# Patient Record
Sex: FEMALE | Race: WHITE | NOT HISPANIC OR LATINO | URBAN - METROPOLITAN AREA
[De-identification: names, ages, dates, MRNs, and addresses within clinical notes are randomized per-mention and may not be internally consistent; named-entity substitution may affect disease eponyms.]

---

## 2023-07-15 DIAGNOSIS — F32.A DEPRESSION, UNSPECIFIED DEPRESSION TYPE: Primary | ICD-10-CM

## 2023-07-15 RX ORDER — SERTRALINE HYDROCHLORIDE 50 MG/1
50 TABLET, FILM COATED ORAL DAILY
Qty: 10 TABLET | Refills: 0 | Status: SHIPPED | OUTPATIENT
Start: 2023-07-15 | End: 2023-08-16 | Stop reason: ALTCHOICE

## 2023-08-11 ENCOUNTER — LAB (OUTPATIENT)
Dept: LAB | Facility: LAB | Age: 63
End: 2023-08-11
Payer: COMMERCIAL

## 2023-08-11 DIAGNOSIS — Z00.00 HEALTH MAINTENANCE EXAMINATION: ICD-10-CM

## 2023-08-11 LAB
ALANINE AMINOTRANSFERASE (SGPT) (U/L) IN SER/PLAS: 16 U/L (ref 7–45)
ALBUMIN (G/DL) IN SER/PLAS: 4.7 G/DL (ref 3.4–5)
ALKALINE PHOSPHATASE (U/L) IN SER/PLAS: 63 U/L (ref 33–136)
ANION GAP IN SER/PLAS: 13 MMOL/L (ref 10–20)
APPEARANCE, URINE: CLEAR
ASPARTATE AMINOTRANSFERASE (SGOT) (U/L) IN SER/PLAS: 24 U/L (ref 9–39)
BASOPHILS (10*3/UL) IN BLOOD BY AUTOMATED COUNT: 0.04 X10E9/L (ref 0–0.1)
BASOPHILS/100 LEUKOCYTES IN BLOOD BY AUTOMATED COUNT: 1.5 % (ref 0–2)
BILIRUBIN TOTAL (MG/DL) IN SER/PLAS: 0.4 MG/DL (ref 0–1.2)
BILIRUBIN, URINE: NEGATIVE
BLOOD, URINE: NEGATIVE
C REACTIVE PROTEIN (MG/L) IN SER/PLAS BY HIGH SENSIT: 0.3 MG/L
CALCIDIOL (25 OH VITAMIN D3) (NG/ML) IN SER/PLAS: 53 NG/ML
CALCIUM (MG/DL) IN SER/PLAS: 9.7 MG/DL (ref 8.6–10.6)
CARBON DIOXIDE, TOTAL (MMOL/L) IN SER/PLAS: 27 MMOL/L (ref 21–32)
CHLORIDE (MMOL/L) IN SER/PLAS: 104 MMOL/L (ref 98–107)
CHOLESTEROL (MG/DL) IN SER/PLAS: 238 MG/DL (ref 0–199)
CHOLESTEROL IN HDL (MG/DL) IN SER/PLAS: 71.3 MG/DL
CHOLESTEROL/HDL RATIO: 3.3
COLOR, URINE: YELLOW
CREATININE (MG/DL) IN SER/PLAS: 0.54 MG/DL (ref 0.5–1.05)
EOSINOPHILS (10*3/UL) IN BLOOD BY AUTOMATED COUNT: 0.09 X10E9/L (ref 0–0.7)
EOSINOPHILS/100 LEUKOCYTES IN BLOOD BY AUTOMATED COUNT: 3.3 % (ref 0–6)
ERYTHROCYTE DISTRIBUTION WIDTH (RATIO) BY AUTOMATED COUNT: 12.2 % (ref 11.5–14.5)
ERYTHROCYTE MEAN CORPUSCULAR HEMOGLOBIN CONCENTRATION (G/DL) BY AUTOMATED: 33.3 G/DL (ref 32–36)
ERYTHROCYTE MEAN CORPUSCULAR VOLUME (FL) BY AUTOMATED COUNT: 94 FL (ref 80–100)
ERYTHROCYTES (10*6/UL) IN BLOOD BY AUTOMATED COUNT: 4.68 X10E12/L (ref 4–5.2)
ESTIMATED AVERAGE GLUCOSE FOR HBA1C: 108 MG/DL
GFR FEMALE: >90 ML/MIN/1.73M2
GLUCOSE (MG/DL) IN SER/PLAS: 89 MG/DL (ref 74–99)
GLUCOSE, URINE: NEGATIVE MG/DL
HEMATOCRIT (%) IN BLOOD BY AUTOMATED COUNT: 43.9 % (ref 36–46)
HEMOGLOBIN (G/DL) IN BLOOD: 14.6 G/DL (ref 12–16)
HEMOGLOBIN A1C/HEMOGLOBIN TOTAL IN BLOOD: 5.4 %
IMMATURE GRANULOCYTES/100 LEUKOCYTES IN BLOOD BY AUTOMATED COUNT: 0 % (ref 0–0.9)
KETONES, URINE: NEGATIVE MG/DL
LDL: 154 MG/DL (ref 0–99)
LEUKOCYTE ESTERASE, URINE: NEGATIVE
LEUKOCYTES (10*3/UL) IN BLOOD BY AUTOMATED COUNT: 2.8 X10E9/L (ref 4.4–11.3)
LYMPHOCYTES (10*3/UL) IN BLOOD BY AUTOMATED COUNT: 1.07 X10E9/L (ref 1.2–4.8)
LYMPHOCYTES/100 LEUKOCYTES IN BLOOD BY AUTOMATED COUNT: 38.9 % (ref 13–44)
MONOCYTES (10*3/UL) IN BLOOD BY AUTOMATED COUNT: 0.29 X10E9/L (ref 0.1–1)
MONOCYTES/100 LEUKOCYTES IN BLOOD BY AUTOMATED COUNT: 10.5 % (ref 2–10)
NEUTROPHILS (10*3/UL) IN BLOOD BY AUTOMATED COUNT: 1.26 X10E9/L (ref 1.2–7.7)
NEUTROPHILS/100 LEUKOCYTES IN BLOOD BY AUTOMATED COUNT: 45.8 % (ref 40–80)
NITRITE, URINE: NEGATIVE
NRBC (PER 100 WBCS) BY AUTOMATED COUNT: 0 /100 WBC (ref 0–0)
PH, URINE: 8 (ref 5–8)
PLATELETS (10*3/UL) IN BLOOD AUTOMATED COUNT: 211 X10E9/L (ref 150–450)
POTASSIUM (MMOL/L) IN SER/PLAS: 5.6 MMOL/L (ref 3.5–5.3)
PROTEIN TOTAL: 7 G/DL (ref 6.4–8.2)
PROTEIN, URINE: NEGATIVE MG/DL
SODIUM (MMOL/L) IN SER/PLAS: 138 MMOL/L (ref 136–145)
SPECIFIC GRAVITY, URINE: 1.01 (ref 1–1.03)
THYROTROPIN (MIU/L) IN SER/PLAS BY DETECTION LIMIT <= 0.05 MIU/L: 1.36 MIU/L (ref 0.44–3.98)
TRIGLYCERIDE (MG/DL) IN SER/PLAS: 63 MG/DL (ref 0–149)
UREA NITROGEN (MG/DL) IN SER/PLAS: 10 MG/DL (ref 6–23)
UROBILINOGEN, URINE: <2 MG/DL (ref 0–1.9)
VLDL: 13 MG/DL (ref 0–40)

## 2023-08-11 PROCEDURE — 36415 COLL VENOUS BLD VENIPUNCTURE: CPT

## 2023-08-11 PROCEDURE — 81003 URINALYSIS AUTO W/O SCOPE: CPT

## 2023-08-11 PROCEDURE — 80061 LIPID PANEL: CPT

## 2023-08-11 PROCEDURE — 85025 COMPLETE CBC W/AUTO DIFF WBC: CPT

## 2023-08-11 PROCEDURE — 80053 COMPREHEN METABOLIC PANEL: CPT

## 2023-08-11 PROCEDURE — 86141 C-REACTIVE PROTEIN HS: CPT

## 2023-08-11 PROCEDURE — 84443 ASSAY THYROID STIM HORMONE: CPT

## 2023-08-11 PROCEDURE — 83036 HEMOGLOBIN GLYCOSYLATED A1C: CPT

## 2023-08-11 PROCEDURE — 82306 VITAMIN D 25 HYDROXY: CPT

## 2023-08-16 PROBLEM — M19.90 ARTHRITIS: Status: ACTIVE | Noted: 2023-08-16

## 2023-08-16 PROBLEM — E78.00 HYPERCHOLESTEROLEMIA: Status: ACTIVE | Noted: 2023-08-16

## 2023-08-16 PROBLEM — C44.90 SKIN CANCER: Status: ACTIVE | Noted: 2018-09-30

## 2023-08-16 PROBLEM — I34.0 MITRAL REGURGITATION: Status: ACTIVE | Noted: 2018-09-30

## 2023-08-16 PROBLEM — I34.1 MITRAL VALVE PROLAPSE: Status: ACTIVE | Noted: 2018-09-30

## 2023-08-16 RX ORDER — FLUOROURACIL 50 MG/G
CREAM TOPICAL
COMMUNITY
Start: 2023-03-22

## 2023-08-16 RX ORDER — MULTIVIT,CALC,MINS/IRON/FOLIC 500-18-0.4
TABLET ORAL
COMMUNITY
Start: 2010-02-16

## 2023-08-16 RX ORDER — SERTRALINE HYDROCHLORIDE 50 MG/1
50 TABLET, FILM COATED ORAL
COMMUNITY
Start: 2021-07-14 | End: 2023-08-30 | Stop reason: SDUPTHER

## 2023-08-16 NOTE — PROGRESS NOTES
Physical Exam    Name Lizzeth Funk    Date of Service :8/17/2023      Lizzeth Funk is a 62 y.o. year old female who is being seen for a comprehensive exam    She is generally very healthy.   1. 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 leukopenia - has been documented in the past. No other hematologic abnormalities. Her sister has the same. She denies any frequent infections or any associated symptoms or signs  3. familial hypercholesteremia - Has never been on medication CCS 0 in June of 2020 .  She is not opposed. She does eat a healthy diet and exercises. We have decided to repeat her cardiac calcium score in 2025.    4. Multiple basal cell carcinomas - follows closely with Dermatology. Just had visit. see's her q 6 months   5. Melanoma - on calf of left calf. Dr. Ribera at [facility] Dr. Velazco  5. Mild MVP: Stress Echo 2015 no change from 2011  6. fibroids gets pelvic ultrasound annually (Dr. Dr. Lashawn Prakash ) Last Pap: 2020 normal , HPV: 2020 negative.   Last GYN exam 3/16/23   Pelvic ultrasound 5/22/23 for fibroid and cyst which are stable since 2009. No follow up needed    7. osteopenia - 10/5/22  lowest T score -1.5 in L spine  8. Dysthymia- improved on sertraline 50 mg  but hasn't cired. She is wondering if she should go off of it. She has been on it for 2 years now       Medications    calcium carbonate 600 mg (1,500 mg) Tab    multivits w-ca,fe,other min(ONE-A-DAY WOMENS FORMULA 27 MG-0.4 MG TAB)    omega-3 fatty acids/vitamin e(FISH OIL 1,000 MG CAP)       Patient Active Problem List   Diagnosis    Mitral regurgitation    Hypercholesterolemia    Mitral valve prolapse    Skin cancer    Arthritis        Past Medical History:   Diagnosis Date    Dysthymic disorder         Past Surgical History:   Procedure Laterality Date    DILATION AND CURETTAGE OF UTERUS      for DUB 2018    SKIN CANCER EXCISION      MOHS    WISDOM TOOTH EXTRACTION          Social History     Tobacco Use    Smoking status:  "Never    Smokeless tobacco: Never      Social History     Social History Narrative     with three adult children. 2 grandchildren, ages 4 and 2 1/2 originally from [place].     very [employer]. Goes to [employer], [place] upbringing     diet balanced. Exercises regularly.     Social alcohol use. Nonsmoker    runner, pllates         Family History   Problem Relation Name Age of Onset    Dementia Mother           2023  age of  88    Breast cancer Mother  52    Hyperlipidemia Mother      BRCA1 Negative Mother      Osteoporosis Mother      Coronary artery disease Mother      Hypertension Mother      Frontotemporal dementia Father          d. 81    Stroke Father      No Known Problems Sister          lost a son to accidental Fentanyl overdose    No Known Problems Sister      No Known Problems Brother      Diabetes type II Paternal Grandmother            Current Outpatient Medications:     calcium carbonate (CALCIUM 600 ORAL), Take by mouth., Disp: , Rfl:     fluorouracil (Efudex) 5 % cream, APPLY 1-2 TIMES A DAY DAILY TO ACTINIC KERATOSES FOR 2-4 WEEKS AS TOLERATED. WASH OFF IN AM., Disp: , Rfl:     L. acidophilus/Bifid. animalis 32 billion cell capsule, Take by mouth., Disp: , Rfl:     multivit-iron-FA-calcium-mins (One-A-Day Womens Formula) 18 mg iron-400 mcg-500 mg Ca tablet, Take by mouth., Disp: , Rfl:     sertraline (Zoloft) 50 mg tablet, Take 1 tablet (50 mg) by mouth once daily., Disp: , Rfl:     No Known Allergies    Review of Systems     /68 (BP Location: Left arm, Patient Position: Sitting)   Pulse 81   Temp 36.6 °C (97.9 °F)   Ht 1.575 m (5' 2\")   Wt 46.9 kg (103 lb 8 oz)   SpO2 98%   BMI 18.93 kg/m²  Body mass index is 18.93 kg/m².    Physical Exam  Vitals reviewed.   Constitutional:       General: She is not in acute distress.     Appearance: Normal appearance.   HENT:      Right Ear: Tympanic membrane and external ear normal.      Left Ear: Tympanic membrane and " external ear normal.      Mouth/Throat:      Pharynx: No oropharyngeal exudate or posterior oropharyngeal erythema.   Eyes:      Extraocular Movements: Extraocular movements intact.   Neck:      Vascular: No carotid bruit.   Cardiovascular:      Rate and Rhythm: Normal rate and regular rhythm.      Pulses: Normal pulses.      Heart sounds: Normal heart sounds. No murmur heard.     No gallop.   Pulmonary:      Effort: Pulmonary effort is normal. No respiratory distress.      Breath sounds: Normal breath sounds. No wheezing, rhonchi or rales.   Abdominal:      General: Bowel sounds are normal. There is no distension.      Palpations: There is no mass.      Tenderness: There is no abdominal tenderness. There is no guarding.   Musculoskeletal:         General: No swelling or tenderness. Normal range of motion.      Right lower leg: No edema.      Left lower leg: No edema.   Lymphadenopathy:      Cervical: No cervical adenopathy.      Upper Body:      Right upper body: No supraclavicular or axillary adenopathy.      Left upper body: No supraclavicular or axillary adenopathy.      Lower Body: No right inguinal adenopathy. No left inguinal adenopathy.   Skin:     General: Skin is warm and dry.      Findings: No rash.   Neurological:      General: No focal deficit present.      Mental Status: She is alert.   Psychiatric:         Mood and Affect: Mood normal.         RESULTS/DATA:  Reviewed Standard Labs for this physical with patient ( any significant issues addressed in A/P )       Assessment/Plan     I do suspect that your mildly elevated potassium is from the supplements you were drinking. Just stop it and no further work up needed  You low white blood cell count has been stable for years and since your sister has it too, I suspect genetic and no further work up needed. We will continue to follow it yearly and make sure it stays stable.    We will wait the results of your colonoscopy biopsies.     I agree with trying to  taper off your sertraline.  Stay on 25 mg daily and then go to every other day and then every three days and then stop.  If your depression returns please call  me.        Problem List Items Addressed This Visit    None  Visit Diagnoses       Annual physical exam    -  Primary    Relevant Orders    ECG 12 lead (Clinic Performed)            ROUTINE:     Immunizations tdap will be due after 9/8/24   , she should update her covid and flu in the fall   DEXA: 10/5/22  lowest T score -1.5 in L spine  Mammogram: last completed -5/23/23   PAP/GYN EXAM: Last Pap: 11/9/2020 neg. with neg. HPV /3/16/23   Pelvic ultrasound 5/22/23 for fibroid and cyst which are stable since 2009. No follow up needed  GYN- Dr. Lashawn Prakash  H/o fibroids. Imaging suggests fibroids are decreasing in size from ultrasound in April 2022  simple ovarian cyst in Left ovary  7/13/22 had endometrial bx for evaluation of thickened lining on ultrasound  Neg. no tissue     COLONOSCOPY:8/15/23  one 8 mm polyp in proximal ascending colon.  Pathology pending       OPHTHALMOLOGY:  in September   DERMATOLOGY  regular follow ups  DENTISTRY: current       Judie Woods MD

## 2023-08-17 ENCOUNTER — OFFICE VISIT (OUTPATIENT)
Dept: PRIMARY CARE | Facility: CLINIC | Age: 63
End: 2023-08-17
Payer: COMMERCIAL

## 2023-08-17 VITALS
TEMPERATURE: 97.9 F | HEART RATE: 81 BPM | SYSTOLIC BLOOD PRESSURE: 107 MMHG | HEIGHT: 62 IN | BODY MASS INDEX: 19.05 KG/M2 | WEIGHT: 103.5 LBS | DIASTOLIC BLOOD PRESSURE: 68 MMHG | OXYGEN SATURATION: 98 %

## 2023-08-17 DIAGNOSIS — Z00.00 ANNUAL PHYSICAL EXAM: Primary | ICD-10-CM

## 2023-08-17 PROCEDURE — 1036F TOBACCO NON-USER: CPT | Performed by: INTERNAL MEDICINE

## 2023-08-17 PROCEDURE — UHSPHYS PR UH SELECT PHYSICAL: Performed by: INTERNAL MEDICINE

## 2023-08-17 PROCEDURE — 93000 ELECTROCARDIOGRAM COMPLETE: CPT | Performed by: INTERNAL MEDICINE

## 2023-08-17 NOTE — PATIENT INSTRUCTIONS
I do suspect that your mildly elevated potassium is from the supplements you were drinking. Just stop it and no further work up needed    You low white blood cell count has been stable for years and since your sister has it too, I suspect genetic and no further work up needed. We will continue to follow it yearly and make sure it stays stable.      We will wait the results of your colonoscopy biopsies.     I agree with trying to taper off your sertraline.  Stay on 25 mg daily and then go to every other day and then every three days and then stop.  If your depression returns please call  me.

## 2023-08-30 DIAGNOSIS — F34.1 DYSTHYMIA: Primary | ICD-10-CM

## 2023-08-30 RX ORDER — SERTRALINE HYDROCHLORIDE 50 MG/1
50 TABLET, FILM COATED ORAL
Qty: 90 TABLET | Refills: 3 | Status: SHIPPED | OUTPATIENT
Start: 2023-08-30 | End: 2024-08-29

## 2023-10-25 ENCOUNTER — OFFICE VISIT (OUTPATIENT)
Dept: PRIMARY CARE | Facility: CLINIC | Age: 63
End: 2023-10-25
Payer: COMMERCIAL

## 2023-10-25 DIAGNOSIS — Z23 FLU VACCINE NEED: ICD-10-CM

## 2023-10-25 PROCEDURE — 1036F TOBACCO NON-USER: CPT | Performed by: INTERNAL MEDICINE

## 2023-10-25 PROCEDURE — 90686 IIV4 VACC NO PRSV 0.5 ML IM: CPT | Performed by: INTERNAL MEDICINE

## 2023-10-25 PROCEDURE — 90471 IMMUNIZATION ADMIN: CPT | Performed by: INTERNAL MEDICINE

## 2023-12-10 ENCOUNTER — TELEPHONE (OUTPATIENT)
Dept: PRIMARY CARE | Facility: CLINIC | Age: 63
End: 2023-12-10
Payer: COMMERCIAL

## 2023-12-10 DIAGNOSIS — H10.30 ACUTE BACTERIAL CONJUNCTIVITIS, UNSPECIFIED LATERALITY: Primary | ICD-10-CM

## 2023-12-10 RX ORDER — TOBRAMYCIN 3 MG/ML
1 SOLUTION/ DROPS OPHTHALMIC EVERY 4 HOURS
Qty: 5 ML | Refills: 0 | Status: SHIPPED | OUTPATIENT
Start: 2023-12-10 | End: 2023-12-17

## 2023-12-10 RX ORDER — TOBRAMYCIN 3 MG/ML
1 SOLUTION/ DROPS OPHTHALMIC EVERY 4 HOURS
Qty: 5 ML | Refills: 0 | Status: SHIPPED | OUTPATIENT
Start: 2023-12-10 | End: 2023-12-10 | Stop reason: SDUPTHER

## 2023-12-10 NOTE — TELEPHONE ENCOUNTER
[phone]:   Good Morning Dr Woods. I seem to have pink eye and it’s getting pretty sticky. Is there anything I can do for it besides washing my hands a lot and staying away from others?    Judie Woods:   GM  I  will call you in drops

## 2023-12-11 ENCOUNTER — OFFICE VISIT (OUTPATIENT)
Dept: PRIMARY CARE | Facility: CLINIC | Age: 63
End: 2023-12-11
Payer: COMMERCIAL

## 2023-12-11 VITALS
TEMPERATURE: 97.7 F | DIASTOLIC BLOOD PRESSURE: 77 MMHG | HEART RATE: 66 BPM | SYSTOLIC BLOOD PRESSURE: 113 MMHG | OXYGEN SATURATION: 99 %

## 2023-12-11 DIAGNOSIS — J01.90 ACUTE NON-RECURRENT SINUSITIS, UNSPECIFIED LOCATION: Primary | ICD-10-CM

## 2023-12-11 PROCEDURE — 99213 OFFICE O/P EST LOW 20 MIN: CPT | Performed by: INTERNAL MEDICINE

## 2023-12-11 PROCEDURE — 1036F TOBACCO NON-USER: CPT | Performed by: INTERNAL MEDICINE

## 2023-12-11 RX ORDER — AMOXICILLIN AND CLAVULANATE POTASSIUM 875; 125 MG/1; MG/1
875 TABLET, FILM COATED ORAL 2 TIMES DAILY
Qty: 20 TABLET | Refills: 0 | Status: SHIPPED | OUTPATIENT
Start: 2023-12-11 | End: 2023-12-21

## 2023-12-11 NOTE — PROGRESS NOTES
Subjective   Patient ID: Lizzeth Funk is a 62 y.o. female who presents for No chief complaint on file..    HPI   Awoke with conjunctivitis on right eye Sunday.  Tobrex called in.  Today present with ear pain.   Started feeling sick last Monday.  With cold and cough.  Her right ear and Jaw is bothering her.  Was babysitting her grandkids     Review of Systems    Objective   /77 (BP Location: Left arm, Patient Position: Sitting)   Pulse 66   Temp 36.5 °C (97.7 °F)   SpO2 99%     Physical Exam  Constitutional:       Appearance: Normal appearance.   HENT:      Left Ear: Tympanic membrane normal.   Eyes:      Comments: Right conjunctival injection    Cardiovascular:      Rate and Rhythm: Normal rate and regular rhythm.   Pulmonary:      Effort: Pulmonary effort is normal.      Breath sounds: Normal breath sounds.   Musculoskeletal:      Cervical back: Normal range of motion and neck supple.   Neurological:      Mental Status: She is alert.         Assessment/Plan     In addition to the antibiotic  Start Mucinex 1200 mg twice daily  Sudafed 60 mg twice a day  Flonase nasal spray      Problem List Items Addressed This Visit    None  Visit Diagnoses         Codes    Acute non-recurrent sinusitis, unspecified location    -  Primary J01.90    Relevant Medications    amoxicillin-pot clavulanate (Augmentin) 875-125 mg tablet

## 2023-12-11 NOTE — PATIENT INSTRUCTIONS
In addition to the antibiotic  Start Mucinex 1200 mg twice daily  Sudafed 60 mg twice a day  Flonase nasal spray

## 2024-01-09 ENCOUNTER — OFFICE VISIT (OUTPATIENT)
Dept: DERMATOLOGY | Facility: CLINIC | Age: 64
End: 2024-01-09
Payer: COMMERCIAL

## 2024-01-09 VITALS — HEART RATE: 65 BPM | DIASTOLIC BLOOD PRESSURE: 67 MMHG | SYSTOLIC BLOOD PRESSURE: 101 MMHG

## 2024-01-09 DIAGNOSIS — C44.310 BASAL CELL CARCINOMA (BCC) OF SKIN OF FACE, UNSPECIFIED PART OF FACE: ICD-10-CM

## 2024-01-09 PROCEDURE — 17311 MOHS 1 STAGE H/N/HF/G: CPT | Performed by: DERMATOLOGY

## 2024-01-09 PROCEDURE — 17312 MOHS ADDL STAGE: CPT | Performed by: DERMATOLOGY

## 2024-01-09 PROCEDURE — 1036F TOBACCO NON-USER: CPT | Performed by: DERMATOLOGY

## 2024-01-09 PROCEDURE — 99214 OFFICE O/P EST MOD 30 MIN: CPT | Performed by: DERMATOLOGY

## 2024-01-09 NOTE — PROGRESS NOTES
Mohs Surgery Operative Note    Date of Surgery:  1/9/2024  Surgeon:  Isaias Carrington MD  Office Location: [address] [address]  Dept: [phone]  Dept Fax: [phone]  Referring Provider: Lucian Ribera MD  [address] [place] [address]  [address]  [place] [address]      Assessment/Plan   Pre-procedure:   Obtained informed consent: written from patient  The surgical site was identified and confirmed with the patient.     Intra-operative:   Audible time out called at : 10:56 AM 01/09/24  by: Aminata Baldwin MA   Verified patient name, birthdate, site, specimen bottle label & requisition.    The planned procedure(s) was again reviewed with the patient. The risks of bleeding, infection, nerve damage and scarring were reviewed. Written authorization was obtained. The patient identity, surgical site, and planned procedure(s) were verified. The provider acted as both surgeon and pathologist.     Basal cell carcinoma (BCC) of skin of face, unspecified part of face  Left Lateral Advent    Mohs surgery    Consent obtained: written    Universal Protocol:  Procedure explained and questions answered to patient or proxy's satisfaction: Yes    Test results available and properly labeled: Yes    Pathology report reviewed: Yes    External notes reviewed: Yes    Photo or diagram used for site identification: Yes    Site/side marked: Yes    Slide independently reviewed by Mohs surgeon: Yes    Immediately prior to procedure a time out was called: Yes    Patient identity confirmed: verbally with patient  Preparation: Patient was prepped and draped in usual sterile fashion      Anticoagulation:  Is the patient taking prescription anticoagulant and/or aspirin prescribed/recommended by a physician? No    Was the anticoagulation regimen changed prior to Mohs? No      Anesthesia:  Anesthesia method: local infiltration  Local anesthetic: lidocaine 2% WITH  epi    Procedure Details:  Biopsy accession number: [id number]  Date of biopsy: 10/9/2023  Pre-Op diagnosis: basal cell carcinoma  Surgery side: left  Surgical site (from skin exam): Left Lateral Holiness  Pre-operative length (cm): 1.4  Pre-operative width (cm): 1.4  Indications for Mohs surgery: anatomic location where tissue conservation is critical  Previously treated? No      Micrographic Surgery Details:  Post-operative length (cm): 1.5  Post-operative width (cm): 1.6  Number of Mohs stages: 2    Stage 1     Comments: The patient was brought into the operating room and placed in the procedure chair in the appropriate position.  The area positive by previous biopsy was identified and confirmed with the patient. The area of clinically obvious tumor was debulked using a curette and/or scalpel as needed. An incision was made following the Mohs approach through the skin. The specimen was taken to the lab, divided into 2 piece(s) and appropriately chromacoded and processed.    .  Tumor was seen on the lateral margins as indicated on the on the Mohs map.  Superficial basal cell carcinoma. Histologic examination revealed small buds of atypical basaloid cells with peripheral palisading and tumoral clefting.             Tumor features identified on Mohs section: basal carcinoma     Immunohistochemical stains utilized comment: epidermis    Stage 2     Comments: The area of positivity as noted on the Mohs map in the previous stage was identified and removed using the Mohs technique. The specimen was taken to the lab and appropriately chromacoded and processed in 1 piece(s).               Tumor features identified on Mohs section: no tumor identified    Depth of defect: subcutaneous fat    Patient tolerance of procedure: tolerated well, no immediate complications    Reconstruction:  Was the defect reconstructed?: No    Fine/surface layer approximation (top stitches)   Hemostasis achieved with: electrodesiccation  Outcome:  patient tolerated procedure well with no complications    Post-procedure details: sterile dressing applied and wound care instructions given    Dressing type: petrolatum, Telfa pad, pressure dressing and Gelfoam    Additional details:  Repair: After a discussion with the patient regarding the options for wound closure, a decision was made to proceed with second intention healing.  Dressing F/U: Surgifoam was placed in the wound. A pressure dressing was placed to help stabilize the wound and to minimize the risk of postoperative bleeding. Wound care was discussed, and the patient was given written post-operative wound care instructions.       The final repair measured 1.5 x 1.6 cm          Wound care was discussed, and the patient was given written post-operative wound care instructions.      The patient will follow up with Isaias Carrington MD as needed for any post operative problems or concerns, and will follow up with their primary dermatologist as scheduled.

## 2024-01-09 NOTE — PROGRESS NOTES
Office Visit Note  Date: 1/9/2024  Surgeon:  Isaias Carrington MD  Office Location: [address]   [address]  [facility] [address]  Dept: [phone]  Dept Fax: [phone]  Referring Provider: Lucian Ribera MD  [address] [place] [address]  [address]  [place] [address]    Subjective   Lizzeth Funk is a 63 y.o. female who presents for the following: MOHS Surgery    According to the patient, the lesion has been presnt for approximately greater than 1 year at the time of diagnosis.  The lesion is not causing symptoms.  The lesion has not been treated previously.    The patient does not have a pacemaker / defibrillator.  The patient does not have a heart valve / joint replacement.    The patient is not on blood thinners.  The patient does not have a history of hepatitis B or C.  The patient does not have a history of HIV.  The patient does not have a history of immunosuppression (e.g. organ transplantation, malignancy, medications)    Review of Systems:  No other skin or systemic complaints other than what is documented elsewhere in the note.    MEDICAL HISTORY: clinically relevant history including significant past medical history, medications and allergies was reviewed and documented in Epic.    Objective   Well appearing patient in no apparent distress; mood and affect are within normal limits.  Vital signs: See record.  Noted on the Left Lateral Temple  Is a 1.4 x 1.4 cm scar        The patient confirmed the identified site.    Discussion:  The nature of the diagnosis was explained. The lesion is a skin cancer.  It has a risk of local growth and distant spread. The condition is associated with sun exposure.  Warning signs of non-melanoma skin cancer discussed. Patient was instructed to perform monthly self skin examination.  We recommended that the patient have regular full skin exams given an increased risk of subsequent skin cancers. The patient was instructed to  use sun protective behaviors including use of broad spectrum sunscreens and sun protective clothing to reduce risk of skin cancers.      Risks, benefits, side effects of Mohs surgery were discussed with patient and the patient voiced understanding.  It was explained that even though the cure rate of Mohs is very high it is not 100%. Risks of surgery including but not limited to bleeding, infection, numbness, nerve damage, and scar were reviewed.  Discussion included wound care requirements, activity restrictions, likely scar outcome and time to heal.     After Mohs surgery, the defect may need to be repaired surgically and the scar may be longer than the original lesion.  Reconstruction options, risks, and benefits were reviewed including second intention healing, linear repair (4-1 ratio was explained), local flaps, skin grafts, cartilage grafts and interpolation flaps (the need for multiple surgeries was explained). Possible outcomes were reviewed including likely scar appearance, failure of flap survival, infection, bleeding and the need for revision surgery.     The pathology was reviewed, the photograph was reviewed, and the referring physician's note was reviewed.    Patient elected for Mohs surgery.    The patient has a basal cell carcinoma.  The pathology was reviewed, the photograph was reviewed, and the referring physicians note was reviewed.  Multiple treatment options including mohs surgery (which has moderate risk of morbidity) were reviewed.    Medical Decision Making  Column 1- Basal Cell Carcinoma (1 acute uncomplicated illness- Low)  Column 2- 3 tests reviewed (pathology, photograph, refering physician notes- Moderate)  Column 3- Modertate risk of morbidity from additional treatment- mohs surgery- Moderate)    Overall Moderate MDM

## 2024-01-09 NOTE — PROGRESS NOTES
Mohs Surgery Operative Note    Date of Surgery:  1/9/2024  Surgeon:  Isaias Carrington MD  Office Location: [address] [address]  Dept: [phone]  Dept Fax: [phone]  Referring Provider: Lucian Ribera MD  [address] [place] [address]  [address]  [place] [address]      Assessment/Plan   Pre-procedure:   Obtained informed consent: written from patient  The surgical site was identified and confirmed with the patient.     Intra-operative:   Audible time out called at : 10:47 AM 01/09/24  by: Aminata Baldwin MA   Verified patient name, birthdate, site, specimen bottle label & requisition.    The planned procedure(s) was again reviewed with the patient. The risks of bleeding, infection, nerve damage and scarring were reviewed. Written authorization was obtained. The patient identity, surgical site, and planned procedure(s) were verified. The provider acted as both surgeon and pathologist.     Basal cell carcinoma (BCC) of skin of face, unspecified part of face  Left Lateral Tenriism    Mohs surgery    Consent obtained: written    Universal Protocol:  Procedure explained and questions answered to patient or proxy's satisfaction: Yes    Test results available and properly labeled: Yes    Pathology report reviewed: Yes    External notes reviewed: Yes    Photo or diagram used for site identification: Yes    Site/side marked: Yes    Slide independently reviewed by Mohs surgeon: Yes    Immediately prior to procedure a time out was called: Yes    Patient identity confirmed: verbally with patient  Preparation: Patient was prepped and draped in usual sterile fashion      Anticoagulation:  Is the patient taking prescription anticoagulant and/or aspirin prescribed/recommended by a physician? No    Was the anticoagulation regimen changed prior to Mohs? No      Anesthesia:  Anesthesia method: local infiltration  Local anesthetic: lidocaine 2% WITH  epi    Procedure Details:  Biopsy accession number: [id number]  Date of biopsy: 10/9/2023  Pre-Op diagnosis: basal cell carcinoma  Surgery side: left  Surgical site (from skin exam): Left Lateral Sikhism  Pre-operative length (cm): 1.4  Pre-operative width (cm): 1.4  Indications for Mohs surgery: anatomic location where tissue conservation is critical  Previously treated? No      Micrographic Surgery Details:  Post-operative length (cm): 1.5  Post-operative width (cm): 1.6  Number of Mohs stages: 2    Stage 1     Comments: The patient was brought into the operating room and placed in the procedure chair in the appropriate position.  The area positive by previous biopsy was identified and confirmed with the patient. The area of clinically obvious tumor was debulked using a curette and/or scalpel as needed. An incision was made following the Mohs approach through the skin. The specimen was taken to the lab, divided into 2 piece(s) and appropriately chromacoded and processed.    .  Tumor was seen on the lateral margins as indicated on the on the Mohs map.  Superficial basal cell carcinoma. Histologic examination revealed small buds of atypical basaloid cells with peripheral palisading and tumoral clefting.             Tumor features identified on Mohs section: basal carcinoma     Immunohistochemical stains utilized comment: epidermis    Stage 2     Comments: The area of positivity as noted on the Mohs map in the previous stage was identified and removed using the Mohs technique. The specimen was taken to the lab and appropriately chromacoded and processed in 1 piece(s).               Tumor features identified on Mohs section: no tumor identified    Depth of defect: subcutaneous fat    Patient tolerance of procedure: tolerated well, no immediate complications    Reconstruction:  Was the defect reconstructed? Yes    Was reconstruction performed by the same Mohs surgeon? Yes    Setting of reconstruction: outpatient  office  When was reconstruction performed? same day  Type of reconstruction: linear  Linear reconstruction: complex  Length of linear repair (cm): 3  Subcutaneous Layers (Deep Stitches)   Suture size:  4-0  Suture type:  Vicryl  Stitches:  Buried vertical mattress  Fine/surface layer approximation (top stitches)   Epidermal/Superficial suture size:  5-0  Epidermal/Superficial suture type:  Fast-absorbing gut  Stitches: simple running    Hemostasis achieved with: electrodesiccation  Outcome: patient tolerated procedure well with no complications    Post-procedure details: sterile dressing applied and wound care instructions given    Dressing type: petrolatum, Telfa pad and pressure dressing          Complex Linear Repair - Wide Undermining:  Given the location and size of the defect, it was determined that a complex layered linear closure was required to restore normal anatomy and function. The repair was considered complex because extensive undermining was required and performed. The amount of undermining performed was greater than the maximum width of the defect as measured perpendicular to the closure line along at least one entire edge of the defect. Standing cutaneous cones were removed using Burow's triangles. The wound edges were brought into close approximation with buried vertical mattress sutures. The remainder of the wound was then closed with epidermal top sutures.      The final repair measured 3 cm          Wound care was discussed, and the patient was given written post-operative wound care instructions.      The patient will follow up with Isaias Carrington MD as needed for any post operative problems or concerns, and will follow up with their primary dermatologist as scheduled.

## 2024-01-09 NOTE — LETTER
MOH's Provider/Referral Letter Treatment Plan    Patient: Lizzeth Funk   YOB: 1960   Date of Visit: 1/9/2024   MRN: [id number]     Lucian Ribera MD  [address] [place] [address]    Dear Lucian Ribera MD,     I had the pleasure of seeing Lizzeth Funk today in consultation at your request for evaluation and treatment of:  1. Basal cell carcinoma (BCC) of skin of face, unspecified part of face  Left Lateral [place]    Mohs surgery    Staff Communication: Dermatology Local Anesthesia: Site Location: Left Lateral [place] 2%Lidocaine / Epinephrine - Amount: 6 CC      Mohs surgery was indicated because of the nature of the lesion and the need to obtain the highest cure rate.  After informed consent was obtained, the patient underwent the procedure without complication.    The skin cancer was removed, wound care instructions were given and the patient was advised to follow up with you.  I will see the patient post-operatively as indicated.    Thank you very much for your confidence in me and for allowing me to share in the care of this patient.    1. Basal cell carcinoma (BCC) of skin of face, unspecified part of face  Left Lateral [place]  Is a 1.4 x 1.4 cm scar    Mohs surgery    Consent obtained: written    Universal Protocol:  Procedure explained and questions answered to patient or proxy's satisfaction: Yes    Test results available and properly labeled: Yes    Pathology report reviewed: Yes    External notes reviewed: Yes    Photo or diagram used for site identification: Yes    Site/side marked: Yes    Slide independently reviewed by Mohs surgeon: Yes    Immediately prior to procedure a time out was called: Yes    Patient identity confirmed: verbally with patient  Preparation: Patient was prepped and draped in usual sterile fashion      Anticoagulation:  Is the patient taking prescription anticoagulant and/or aspirin prescribed/recommended by a physician? No     Was the anticoagulation regimen changed prior to Mohs? No      Anesthesia:  Anesthesia method: local infiltration  Local anesthetic: lidocaine 2% WITH epi    Procedure Details:  Biopsy accession number: [id number]  Date of biopsy: 10/9/2023  Pre-Op diagnosis: basal cell carcinoma  Surgery side: left  Surgical site (from skin exam): Left Lateral [place]  Pre-operative length (cm): 1.4  Pre-operative width (cm): 1.4  Indications for Mohs surgery: anatomic location where tissue conservation is critical  Previously treated? No      Micrographic Surgery Details:  Post-operative length (cm): 1.5  Post-operative width (cm): 1.6  Number of Mohs stages: 2    Stage 1     Comments: The patient was brought into the operating room and placed in the procedure chair in the appropriate position.  The area positive by previous biopsy was identified and confirmed with the patient. The area of clinically obvious tumor was debulked using a curette and/or scalpel as needed. An incision was made following the Mohs approach through the skin. The specimen was taken to the lab, divided into 2 piece(s) and appropriately chromacoded and processed.    .  Tumor was seen on the lateral margins as indicated on the on the Mohs map.  Superficial basal cell carcinoma. Histologic examination revealed small buds of atypical basaloid cells with peripheral palisading and tumoral clefting.             Tumor features identified on Mohs section: basal carcinoma     Immunohistochemical stains utilized comment: epidermis    Stage 2     Comments: The area of positivity as noted on the Mohs map in the previous stage was identified and removed using the Mohs technique. The specimen was taken to the lab and appropriately chromacoded and processed in 1 piece(s).               Tumor features identified on Mohs section: no tumor identified    Depth of defect: subcutaneous fat    Patient tolerance of procedure: tolerated well, no immediate  complications    Reconstruction:  Was the defect reconstructed?: No    Fine/surface layer approximation (top stitches)   Hemostasis achieved with: electrodesiccation  Outcome: patient tolerated procedure well with no complications    Post-procedure details: sterile dressing applied and wound care instructions given    Dressing type: petrolatum, Telfa pad, pressure dressing and Gelfoam    Additional details:  Repair: After a discussion with the patient regarding the options for wound closure, a decision was made to proceed with second intention healing.  Dressing F/U: Surgifoam was placed in the wound. A pressure dressing was placed to help stabilize the wound and to minimize the risk of postoperative bleeding. Wound care was discussed, and the patient was given written post-operative wound care instructions.       Staff Communication: Dermatology Local Anesthesia: Site Location: Left Lateral [place] 2%Lidocaine / Epinephrine - Amount: 6 CC           Sincerely,       Isaias Carrington MD  [facility]

## 2024-06-18 DIAGNOSIS — Z00.00 HEALTHCARE MAINTENANCE: ICD-10-CM

## 2024-07-16 ENCOUNTER — APPOINTMENT (OUTPATIENT)
Dept: DERMATOLOGY | Facility: CLINIC | Age: 64
End: 2024-07-16
Payer: COMMERCIAL

## 2024-07-16 DIAGNOSIS — C44.319 BASAL CELL CARCINOMA (BCC) OF SKIN OF OTHER PART OF FACE: ICD-10-CM

## 2024-07-16 PROCEDURE — 13132 CMPLX RPR F/C/C/M/N/AX/G/H/F: CPT | Performed by: DERMATOLOGY

## 2024-07-16 PROCEDURE — 17311 MOHS 1 STAGE H/N/HF/G: CPT | Performed by: DERMATOLOGY

## 2024-07-16 PROCEDURE — 17312 MOHS ADDL STAGE: CPT | Performed by: DERMATOLOGY

## 2024-07-16 PROCEDURE — 99214 OFFICE O/P EST MOD 30 MIN: CPT | Performed by: DERMATOLOGY

## 2024-07-16 NOTE — PROGRESS NOTES
Office Visit Note  Date: 7/16/2024  Surgeon:  Isaias Carrington MD  Office Location:  [address]  [facility] [address]  Dept: [phone]  Dept Fax: [phone]  Referring Provider: Lucian Ribera MD  [address] [place] [address] [address]  [place] [address]    Subjective   Lizzeth Funk is a 63 y.o. female who presents for the following: MOHS Surgery    According to the patient, the lesion has been present for approximately 6 months at the time of diagnosis.  The lesion is itchy.  The lesion has not been treated previously.    The patient does not have a pacemaker / defibrillator.  The patient does not have a heart valve / joint replacement.    The patient is not on blood thinners.  The patient does not have a history of hepatitis B or C.  The patient does not have a history of HIV.  The patient does not have a history of immunosuppression (e.g. organ transplantation, malignancy, medications)    Review of Systems:  No other skin or systemic complaints other than what is documented elsewhere in the note.    MEDICAL HISTORY: clinically relevant history including significant past medical history, medications and allergies was reviewed and documented in Epic.    Objective   Well appearing patient in no apparent distress; mood and affect are within normal limits.  Vital signs: See record.  Noted on the Left Inferior Forehead  Is a 0.9 x 0.8 cm scar        The patient confirmed the identified site.    Discussion:  The nature of the diagnosis was explained. The lesion is a skin cancer.  It has a risk of local growth and distant spread. The condition is associated with sun exposure.  Warning signs of non-melanoma skin cancer discussed. Patient was instructed to perform monthly self skin examination.  We recommended that the patient have regular full skin exams given an increased risk of subsequent skin cancers. The patient was instructed to use sun protective  behaviors including use of broad spectrum sunscreens and sun protective clothing to reduce risk of skin cancers.      Risks, benefits, side effects of Mohs surgery were discussed with patient and the patient voiced understanding.  It was explained that even though the cure rate of Mohs is very high it is not 100%. Risks of surgery including but not limited to bleeding, infection, numbness, nerve damage, and scar were reviewed.  Discussion included wound care requirements, activity restrictions, likely scar outcome and time to heal.     After Mohs surgery, the defect may need to be repaired surgically and the scar may be longer than the original lesion.  Reconstruction options, risks, and benefits were reviewed including second intention healing, linear repair (4-1 ratio was explained), local flaps, skin grafts, cartilage grafts and interpolation flaps (the need for multiple surgeries was explained). Possible outcomes were reviewed including likely scar appearance, failure of flap survival, infection, bleeding and the need for revision surgery.     The pathology was reviewed, the photograph was reviewed, and the referring physician's note was reviewed.    Patient elected for Mohs surgery.    The patient has a basal cell carcinoma.  The pathology was reviewed, the photograph was reviewed, and the referring physicians note was reviewed.  Multiple treatment options including mohs surgery (which has moderate risk of morbidity) were reviewed.    Medical Decision Making  Column 1- Basal Cell Carcinoma (1 acute uncomplicated illness- Low)  Column 2- 3 tests reviewed (pathology, photograph, refering physician notes- Moderate)  Column 3- Modertate risk of morbidity from additional treatment- mohs surgery- Moderate)    Overall Moderate MDM

## 2024-07-16 NOTE — LETTER
MOH's Provider/Referral Letter Treatment Plan    Patient: Lizzeth Funk   YOB: 1960   Date of Visit: 7/16/2024   MRN: [id number]     Lucian Ribera MD  [address] [facility] [address] [place] [address],  [place] [address]    Dear Lucian Ribera MD,     I had the pleasure of seeing Lizzeth Funk today in consultation at your request for evaluation and treatment of:  1. Basal cell carcinoma (BCC) of skin of other part of face  Left Inferior Forehead    Mohs surgery    Staff Communication: Dermatology Local Anesthesia: Site Location: Left Inferior Forehead 1 % Lidocaine / Epinephrine - Amount:3.7cc's      Mohs surgery was indicated because of the nature of the lesion and the need to obtain the highest cure rate.  After informed consent was obtained, the patient underwent the procedure without complication.    The skin cancer was removed, wound care instructions were given and the patient was advised to follow up with you.  I will see the patient post-operatively as indicated.    Thank you very much for your confidence in me and for allowing me to share in the care of this patient.    1. Basal cell carcinoma (BCC) of skin of other part of face  Left Inferior Forehead  Is a 0.9 x 0.8 cm scar    Mohs surgery    Consent obtained: written    Universal Protocol:  Procedure explained and questions answered to patient or proxy's satisfaction: Yes    Test results available and properly labeled: Yes    Pathology report reviewed: Yes    External notes reviewed: Yes    Photo or diagram used for site identification: Yes    Site/side marked: Yes    Slide independently reviewed by Mohs surgeon: Yes    Immediately prior to procedure a time out was called: Yes    Patient identity confirmed: verbally with patient  Preparation: Patient was prepped and draped in usual sterile fashion      Anticoagulation:  Is the patient taking prescription anticoagulant and/or aspirin prescribed/recommended by a physician? No    Was the  anticoagulation regimen changed prior to Mohs? No      Anesthesia:  Anesthesia method: local infiltration  Local anesthetic: lidocaine 1% WITH epi    Procedure Details:  Biopsy accession number: [id number](outside path)  Date of biopsy: 5/9/2024  Pre-Op diagnosis: basal cell carcinoma  BCC subtype: nodular  Surgery side: left  Surgical site (from skin exam): Left Inferior Forehead  Pre-operative length (cm): 0.9  Pre-operative width (cm): 0.8  Indications for Mohs surgery: anatomic location where tissue conservation is critical  Previously treated? No      Micrographic Surgery Details:  Post-operative length (cm): 1.3  Post-operative width (cm): 1  Number of Mohs stages: 2    Stage 1     Comments: The patient was brought into the operating room and placed in the procedure chair in the appropriate position.  The area positive by previous biopsy was identified and confirmed with the patient. The area of clinically obvious tumor was debulked using a curette and/or scalpel as needed. An incision was made following the Mohs approach through the skin. The specimen was taken to the lab, divided into 2 piece(s) and appropriately chromacoded and processed.  .  Tumor was seen on the lateral margins as indicated on the on the Mohs map.  Nodular basal cell carcinoma. Histologic examination revealed large tumor aggregates of atypical basaloid cells with peripheral palisading and tumoral clefting.                  Tumor features identified on Mohs section: basal carcinoma      Depth of invasion: dermis    Stage 2     Comments: The area of positivity as noted on the Mohs map in the previous stage was identified and removed using the Mohs technique. The specimen was taken to the lab and appropriately chromacoded and processed in 1 piece(s).     Tumor features identified on Mohs section: no tumor identified    Depth of defect: subcutaneous fat    Patient tolerance of procedure: tolerated well, no immediate  complications    Reconstruction:  Was the defect reconstructed? Yes    Was reconstruction performed by the same Mohs surgeon? Yes    When was reconstruction performed? same day  Type of reconstruction: linear  Linear reconstruction: complex  Length of linear repair (cm): 4.2  Subcutaneous Layers (Deep Stitches)   Suture size:  5-0  Suture type:  Monocryl  Stitches:  Buried vertical mattress  Fine/surface layer approximation (top stitches)   Epidermal/Superficial suture size:  5-0  Epidermal/Superficial suture type:  Prolene  Stitches: simple running    Hemostasis achieved with: suture, pressure and electrodesiccation  Outcome: patient tolerated procedure well with no complications    Post-procedure details: sterile dressing applied and wound care instructions given    Dressing type: pressure dressing      Staff Communication: Dermatology Local Anesthesia: Site Location: Left Inferior Forehead 1 % Lidocaine / Epinephrine - Amount:3.7cc's           Sincerely,       Isaias Carrington MD  [facility]

## 2024-07-16 NOTE — PROGRESS NOTES
Mohs Surgery Operative Note    Date of Surgery:  7/16/2024  Surgeon:  Isaias Carrington MD  Office Location: [address]  [facility] [address]  Dept: [phone]  Dept Fax: [phone]  Referring Provider: Lucian Ribera MD  [address] [place] [address] [address]  [place] [address]      Assessment/Plan   Pre-procedure:   Obtained informed consent: written from patient  The surgical site was identified and confirmed with the patient.     Intra-operative:   Audible time out called at : 8:35 AM 07/16/24  by: Samantha Dalton MA   Verified patient name, birthdate, site, specimen bottle label & requisition.    The planned procedure(s) was again reviewed with the patient. The risks of bleeding, infection, nerve damage and scarring were reviewed. Written authorization was obtained. The patient identity, surgical site, and planned procedure(s) were verified. The provider acted as both surgeon and pathologist.     Basal cell carcinoma (BCC) of skin of other part of face  Left Inferior Forehead  Mohs surgery  Consent obtained: written    Universal Protocol:  Procedure explained and questions answered to patient or proxy's satisfaction: Yes    Test results available and properly labeled: Yes    Pathology report reviewed: Yes    External notes reviewed: Yes    Photo or diagram used for site identification: Yes    Site/side marked: Yes    Slide independently reviewed by Mohs surgeon: Yes    Immediately prior to procedure a time out was called: Yes    Patient identity confirmed: verbally with patient  Preparation: Patient was prepped and draped in usual sterile fashion      Anticoagulation:  Is the patient taking prescription anticoagulant and/or aspirin prescribed/recommended by a physician? No    Was the anticoagulation regimen changed prior to Mohs? No      Anesthesia:  Anesthesia method: local infiltration  Local anesthetic: lidocaine 1% WITH epi    Procedure  Details:  Biopsy accession number: [id number](outside path)  Date of biopsy: 5/9/2024  Pre-Op diagnosis: basal cell carcinoma  BCC subtype: nodular  Surgery side: left  Surgical site (from skin exam): Left Inferior Forehead  Pre-operative length (cm): 0.9  Pre-operative width (cm): 0.8  Indications for Mohs surgery: anatomic location where tissue conservation is critical  Previously treated? No      Micrographic Surgery Details:  Post-operative length (cm): 1.3  Post-operative width (cm): 1  Number of Mohs stages: 2    Stage 1     Comments: The patient was brought into the operating room and placed in the procedure chair in the appropriate position.  The area positive by previous biopsy was identified and confirmed with the patient. The area of clinically obvious tumor was debulked using a curette and/or scalpel as needed. An incision was made following the Mohs approach through the skin. The specimen was taken to the lab, divided into 2 piece(s) and appropriately chromacoded and processed.    Tumor was seen on the lateral margins as indicated on the on the Mohs map.  Nodular basal cell carcinoma. Histologic examination revealed large tumor aggregates of atypical basaloid cells with peripheral palisading and tumoral clefting.       Tumor features identified on Mohs section: basal carcinoma   Depth of invasion: dermis    Stage 2     Comments: The area of positivity as noted on the Mohs map in the previous stage was identified and removed using the Mohs technique. The specimen was taken to the lab and appropriately chromacoded and processed in 1 piece(s).       Tumor features identified on Mohs section: no tumor identified  Depth of defect: subcutaneous fat    Patient tolerance of procedure: tolerated well, no immediate complications    Reconstruction:  Was the defect reconstructed? Yes    Was reconstruction performed by the same Mohs surgeon? Yes    When was reconstruction performed? same day  Type of reconstruction:  linear  Linear reconstruction: complex  Length of linear repair (cm): 4.2  Subcutaneous Layers (Deep Stitches)   Suture size:  5-0  Suture type:  Monocryl  Stitches:  Buried vertical mattress  Fine/surface layer approximation (top stitches)   Epidermal/Superficial suture size:  5-0  Epidermal/Superficial suture type:  Prolene  Stitches: simple running    Hemostasis achieved with: suture, pressure and electrodesiccation  Outcome: patient tolerated procedure well with no complications    Post-procedure details: sterile dressing applied and wound care instructions given    Dressing type: pressure dressing          Complex Linear Repair - Wide Undermining:  Given the location and size of the defect, it was determined that a complex layered linear closure was required to restore normal anatomy and function. The repair was considered complex because extensive undermining was required and performed. The amount of undermining performed was greater than the maximum width of the defect as measured perpendicular to the closure line along at least one entire edge of the defect. Standing cutaneous cones were removed using Burow's triangles. The wound edges were brought into close approximation with buried vertical mattress sutures. The remainder of the wound was then closed with epidermal top sutures.    The final repair measured 4.2 cm              Wound care was discussed, and the patient was given written post-operative wound care instructions.      The patient will follow up with Isaias Carrington MD as needed for any post operative problems or concerns, and will follow up with their primary dermatologist as scheduled.

## 2024-07-23 ENCOUNTER — APPOINTMENT (OUTPATIENT)
Dept: DERMATOLOGY | Facility: CLINIC | Age: 64
End: 2024-07-23
Payer: COMMERCIAL

## 2024-07-23 DIAGNOSIS — Z51.89 VISIT FOR WOUND CHECK: ICD-10-CM

## 2024-07-23 PROCEDURE — 99024 POSTOP FOLLOW-UP VISIT: CPT | Performed by: DERMATOLOGY

## 2024-07-23 NOTE — PROGRESS NOTES
72 Ventricular Rate BPM  72 Atrial Rate BPM  177 P-R Interval ms  92 QRS Duration ms  406 Q-T Interval ms  445 QTC Calculation(Bazett) ms  26 P Axis degrees  -40 R Axis degrees  35 T Axis degrees  Sinus rhythm  Left anterior fascicular block  Consider left ventricular hypertrophy  Confirmed by Georgia Young ([id number]) on 12/26/2019 7:38:42 PM   Sutures removed, steri strips applied.  Follow up as needed.

## 2024-07-30 ENCOUNTER — APPOINTMENT (OUTPATIENT)
Dept: DERMATOLOGY | Facility: CLINIC | Age: 64
End: 2024-07-30
Payer: COMMERCIAL

## 2024-07-30 DIAGNOSIS — Z51.89 VISIT FOR WOUND CHECK: Primary | ICD-10-CM

## 2024-07-30 PROCEDURE — 99024 POSTOP FOLLOW-UP VISIT: CPT | Performed by: STUDENT IN AN ORGANIZED HEALTH CARE EDUCATION/TRAINING PROGRAM

## 2024-07-30 NOTE — PROGRESS NOTES
Office Follow Up Note    Visit Summary  Chief Complaint    1. Complaint Wound check.    Lizzeth Funk is a 63 y.o. female who presents for 2 week follow up after surgery for a basal cell carcinoma. She reports that she had subcutaneous swelling after spending time with a hat on that caused a bit of soreness. She states that it spontaneously expressed serous and bloody fluid and the soreness resolved. She presents today to ensure that her wound is still healing appropriately.     Location Operation site location: left inferior forehead    On exam,  Ms. Funk is well-appearing and in no apparent distress. The surgical site appears clean with minimal to no erythema. No tenderness and good wound edge apposition. There is a small well healing wound with overlying heme crust on the superior aspect of the wound, but overall healing well.     Assessment and Plan:  History of skin cancer requiring ongoing monitoring for recurrence and additional lesion development.   The patient was reassured that the wound is healing appropriately.   The dressing was removed, the wound cleaned a a new dressing reapplied.     The patient was advised on the importance of routine skin monitoring including follow up with general dermatology and instructed to call with any further concerns. The patient will return as needed    Singh Blue MD, PGY-5  , Department of Dermatology  Micrographic Surgery and Cutaneous Oncology Fellow  7/30/2024

## 2024-08-20 ENCOUNTER — APPOINTMENT (OUTPATIENT)
Dept: PRIMARY CARE | Facility: CLINIC | Age: 64
End: 2024-08-20
Payer: COMMERCIAL

## 2024-08-26 ENCOUNTER — LAB (OUTPATIENT)
Dept: LAB | Facility: LAB | Age: 64
End: 2024-08-26
Payer: COMMERCIAL

## 2024-08-26 DIAGNOSIS — Z00.00 HEALTHCARE MAINTENANCE: ICD-10-CM

## 2024-08-26 LAB
25(OH)D3 SERPL-MCNC: 61 NG/ML (ref 30–100)
ALBUMIN SERPL BCP-MCNC: 4.4 G/DL (ref 3.4–5)
ALP SERPL-CCNC: 66 U/L (ref 33–136)
ALT SERPL W P-5'-P-CCNC: 17 U/L (ref 7–45)
ANION GAP SERPL CALC-SCNC: 14 MMOL/L (ref 10–20)
APPEARANCE UR: CLEAR
AST SERPL W P-5'-P-CCNC: 25 U/L (ref 9–39)
BASOPHILS # BLD AUTO: 0.04 X10*3/UL (ref 0–0.1)
BASOPHILS NFR BLD AUTO: 1.3 %
BILIRUB SERPL-MCNC: 0.8 MG/DL (ref 0–1.2)
BILIRUB UR STRIP.AUTO-MCNC: NEGATIVE MG/DL
BUN SERPL-MCNC: 10 MG/DL (ref 6–23)
CALCIUM SERPL-MCNC: 9.7 MG/DL (ref 8.6–10.6)
CHLORIDE SERPL-SCNC: 101 MMOL/L (ref 98–107)
CHOLEST SERPL-MCNC: 203 MG/DL (ref 0–199)
CHOLESTEROL/HDL RATIO: 2.9
CO2 SERPL-SCNC: 29 MMOL/L (ref 21–32)
COLOR UR: NORMAL
CREAT SERPL-MCNC: 0.5 MG/DL (ref 0.5–1.05)
CRP SERPL HS-MCNC: 0.3 MG/L
EGFRCR SERPLBLD CKD-EPI 2021: >90 ML/MIN/1.73M*2
EOSINOPHIL # BLD AUTO: 0.1 X10*3/UL (ref 0–0.7)
EOSINOPHIL NFR BLD AUTO: 3.3 %
ERYTHROCYTE [DISTWIDTH] IN BLOOD BY AUTOMATED COUNT: 12.3 % (ref 11.5–14.5)
EST. AVERAGE GLUCOSE BLD GHB EST-MCNC: 105 MG/DL
GLUCOSE SERPL-MCNC: 86 MG/DL (ref 74–99)
GLUCOSE UR STRIP.AUTO-MCNC: NORMAL MG/DL
HBA1C MFR BLD: 5.3 %
HCT VFR BLD AUTO: 41.3 % (ref 36–46)
HDLC SERPL-MCNC: 69.9 MG/DL
HGB BLD-MCNC: 13.7 G/DL (ref 12–16)
HOLD SPECIMEN: NORMAL
IMM GRANULOCYTES # BLD AUTO: 0.01 X10*3/UL (ref 0–0.7)
IMM GRANULOCYTES NFR BLD AUTO: 0.3 % (ref 0–0.9)
KETONES UR STRIP.AUTO-MCNC: NEGATIVE MG/DL
LDLC SERPL CALC-MCNC: 124 MG/DL
LEUKOCYTE ESTERASE UR QL STRIP.AUTO: NEGATIVE
LYMPHOCYTES # BLD AUTO: 1.31 X10*3/UL (ref 1.2–4.8)
LYMPHOCYTES NFR BLD AUTO: 43.2 %
MCH RBC QN AUTO: 30.2 PG (ref 26–34)
MCHC RBC AUTO-ENTMCNC: 33.2 G/DL (ref 32–36)
MCV RBC AUTO: 91 FL (ref 80–100)
MONOCYTES # BLD AUTO: 0.31 X10*3/UL (ref 0.1–1)
MONOCYTES NFR BLD AUTO: 10.2 %
NEUTROPHILS # BLD AUTO: 1.26 X10*3/UL (ref 1.2–7.7)
NEUTROPHILS NFR BLD AUTO: 41.7 %
NITRITE UR QL STRIP.AUTO: NEGATIVE
NON HDL CHOLESTEROL: 133 MG/DL (ref 0–149)
NRBC BLD-RTO: 0 /100 WBCS (ref 0–0)
PH UR STRIP.AUTO: 7.5 [PH]
PLATELET # BLD AUTO: 201 X10*3/UL (ref 150–450)
POTASSIUM SERPL-SCNC: 4.8 MMOL/L (ref 3.5–5.3)
PROT SERPL-MCNC: 6.8 G/DL (ref 6.4–8.2)
PROT UR STRIP.AUTO-MCNC: NEGATIVE MG/DL
RBC # BLD AUTO: 4.53 X10*6/UL (ref 4–5.2)
RBC # UR STRIP.AUTO: NEGATIVE /UL
SODIUM SERPL-SCNC: 139 MMOL/L (ref 136–145)
SP GR UR STRIP.AUTO: 1.01
TRIGL SERPL-MCNC: 48 MG/DL (ref 0–149)
TSH SERPL-ACNC: 1.21 MIU/L (ref 0.44–3.98)
UROBILINOGEN UR STRIP.AUTO-MCNC: NORMAL MG/DL
VLDL: 10 MG/DL (ref 0–40)
WBC # BLD AUTO: 3 X10*3/UL (ref 4.4–11.3)

## 2024-08-26 PROCEDURE — 84443 ASSAY THYROID STIM HORMONE: CPT

## 2024-08-26 PROCEDURE — 83036 HEMOGLOBIN GLYCOSYLATED A1C: CPT

## 2024-08-26 PROCEDURE — 85025 COMPLETE CBC W/AUTO DIFF WBC: CPT

## 2024-08-26 PROCEDURE — 80061 LIPID PANEL: CPT

## 2024-08-26 PROCEDURE — 80053 COMPREHEN METABOLIC PANEL: CPT

## 2024-08-26 PROCEDURE — 81003 URINALYSIS AUTO W/O SCOPE: CPT

## 2024-08-26 PROCEDURE — 36415 COLL VENOUS BLD VENIPUNCTURE: CPT

## 2024-08-26 PROCEDURE — 86141 C-REACTIVE PROTEIN HS: CPT

## 2024-08-26 PROCEDURE — 82306 VITAMIN D 25 HYDROXY: CPT

## 2024-09-05 PROBLEM — C44.41 BASAL CELL CARCINOMA OF SKIN OF SCALP AND NECK: Status: RESOLVED | Noted: 2023-05-16 | Resolved: 2024-09-05

## 2024-09-05 PROBLEM — C44.01 BASAL CELL CARCINOMA OF SKIN OF LIP: Status: RESOLVED | Noted: 2023-05-16 | Resolved: 2024-09-05

## 2024-09-05 NOTE — PROGRESS NOTES
Physical Exam    Name Lizzeth Funk    Date of Service :9/6/2024    Lizzeth Funk is a 63 y.o. year old female who is being seen for a comprehensive exam    She is generally very healthy.   1. 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 leukopenia - has been documented in the past. No other hematologic abnormalities. Her sister has the same. She denies any frequent infections or any associated symptoms or signs.  No night sweats, no excessive fatigue  3. familial hypercholesteremia - Had CCS 0 in 2020   4. Multiple basal cell carcinomas - follows closely with Dermatology.  Had a Mohs surgery on her forehead in July that has not healed yet.   5. Melanoma - on left calf .    5. Mild MVP: Stress Echo 2015 no change from 2011  6. fibroids gets pelvic ultrasound annually (Dr. Dr. Lashawn Prakash )    had Pelvic MRI this year.   7. osteopenia - 10/5/22  lowest T score -1.5 in L spine  8. Colon Polyp- 8 mm sesile serrated adenoma     No major health concerns today.   Tapered off her sertraline last summer for a month and then went to going to every other day and felt better on that regimen  Mohs on 7/15 on her forehead.   BCC.  Was draining. Now not but still not healed   Taking sertraline every other day . Good balance for her     Patient Active Problem List   Diagnosis    Mitral regurgitation    Hypercholesterolemia    Mitral valve prolapse    Skin cancer    Arthritis        Past Medical History:   Diagnosis Date    Basal cell carcinoma of skin of lip 05/16/2023    Basal cell carcinoma of skin of scalp and neck 05/16/2023    Dysthymic disorder         Past Surgical History:   Procedure Laterality Date    DILATION AND CURETTAGE OF UTERUS      for DUB 2018    SKIN CANCER EXCISION      MOHS    WISDOM TOOTH EXTRACTION          Social History     Tobacco Use    Smoking status: Never    Smokeless tobacco: Never   Vaping Use    Vaping status: Never Used      Social History     Social History Narrative     with three adult  "children. 2 grandchildren,     originally from [place].     Emotive upbringing     diet balanced.    Exercises regularly.  runner, pllates    Social alcohol use.     Nonsmoker           Family History   Problem Relation Name Age of Onset    Dementia Mother           2023  age of  88    Breast cancer Mother  52    Hyperlipidemia Mother      BRCA1 Negative Mother      Osteoporosis Mother      Coronary artery disease Mother      Hypertension Mother      Frontotemporal dementia Father          d. 81    Stroke Father      No Known Problems Sister          lost a son to accidental Fentanyl overdose    No Known Problems Sister      No Known Problems Brother      Diabetes type II Paternal Grandmother            Current Outpatient Medications:     fluorouracil (Efudex) 5 % cream, APPLY 1-2 TIMES A DAY DAILY TO ACTINIC KERATOSES FOR 2-4 WEEKS AS TOLERATED. WASH OFF IN AM., Disp: , Rfl:     L. acidophilus/Bifid. animalis 32 billion cell capsule, Take by mouth., Disp: , Rfl:     multivit-iron-FA-calcium-mins (One-A-Day Womens Formula) 18 mg iron-400 mcg-500 mg Ca tablet, Take by mouth., Disp: , Rfl:     calcium carbonate (CALCIUM 600 ORAL), Take by mouth., Disp: , Rfl:     mupirocin (Bactroban) 2 % ointment, Apply topically 3 times a day for 10 days. apply to affected area, Disp: 22 g, Rfl: 0    sertraline (Zoloft) 50 mg tablet, Take 1 tablet (50 mg) by mouth once daily., Disp: 90 tablet, Rfl: 0    No Known Allergies    Depression Screen  (Note: if answer to either of the following is \"Yes\", then a more complete depression screening is indicated)   Q1: Over the past two weeks, have you felt down, depressed or hopeless? No  Q2: Over the past two weeks, have you felt little interest or pleasure in doing things? No      ROUTINE:     Immunizations  tdap, covid and flu done     Mammogram: last completed 24   PAP 2020 neg. with neg. HPV     GYN EXAM:  Current .     Pelvic ultrasound 23 for fibroid and " "cyst which are stable since 2009.GYN- Dr. Lashawn Prakash  7/13/22 had endometrial bx for evaluation of thickened lining on ultrasound  Neg. no tissue      COLONOSCOPY: 8/15/23 one 8 mm sessile serrated adenoma in proximal ascending colon.  Repeat 5 years   DEXA: 10/5/22 lowest T score -1.5 in L spine   OPHTHALMOLOGY: current, [place]   DERMATOLOGY-   [facility]  DENTISTRY: current     Review of Systems     /72 (BP Location: Left arm, Patient Position: Sitting, BP Cuff Size: Adult)   Pulse 77   Temp 36.3 °C (97.4 °F)   Ht 1.575 m (5' 2\")   Wt 46.3 kg (102 lb)   SpO2 98%   BMI 18.66 kg/m²  Body mass index is 18.66 kg/m².    Physical Exam  Constitutional:       General: She is not in acute distress.     Appearance: Normal appearance. She is not ill-appearing.   HENT:      Right Ear: Tympanic membrane and ear canal normal.      Left Ear: Tympanic membrane and ear canal normal.      Ears:      Comments: Decreased hearing in right ear   Eyes:      Extraocular Movements: Extraocular movements intact.      Conjunctiva/sclera: Conjunctivae normal.   Cardiovascular:      Rate and Rhythm: Normal rate.      Pulses: Normal pulses.      Heart sounds: Normal heart sounds. No murmur heard.  Pulmonary:      Effort: Pulmonary effort is normal. No respiratory distress.      Breath sounds: Normal breath sounds. No wheezing, rhonchi or rales.   Chest:   Breasts:     Right: Normal. No mass, nipple discharge or skin change.      Left: Normal. No mass, nipple discharge or skin change.   Abdominal:      General: Bowel sounds are normal. There is no distension.      Palpations: There is no mass.      Tenderness: There is no abdominal tenderness. There is no guarding.   Musculoskeletal:         General: Normal range of motion.   Lymphadenopathy:      Cervical: No cervical adenopathy.      Upper Body:      Right upper body: No supraclavicular or axillary adenopathy.      Left upper body: No supraclavicular or axillary adenopathy. "      Lower Body: Right inguinal adenopathy (small pea size chains) present. Left inguinal adenopathy (group of small pea size) present.   Skin:     General: Skin is warm and dry.      Findings: No rash.      Comments: No suspicious rashes or lesions   Neurological:      General: No focal deficit present.      Mental Status: She is alert.   Psychiatric:         Mood and Affect: Mood normal.         RESULTS/DATA:  Reviewed Standard Labs for this physical with patient ( any significant issues addressed in A/P )     ECG:  NSR rvr in lead 2 unchanged       Assessment/Plan   1. Skin infection  MOHS surgery site from July.    - mupirocin (Bactroban) 2 % ointment; Apply topically 3 times a day for 10 days. apply to affected area  Dispense: 22 g; Refill: 0    2. Dysthymia/Anxiety   Doing well on zoloft every other day.   - sertraline (Zoloft) 50 mg tablet; Take 1 tablet (50 mg) by mouth once daily.  Dispense: 90 tablet; Refill: 0    3. Decreased hearing of right ear    - Referral to Audiology; Future    4. Inguinal lymph  nodes  Not enlarged but multiple. Will watch clinically. Recent pelvic MRI no enlarged nodes.  WBC low but stable and familial and lymphocytes normal .    She will return in 6 months for a recheck and repeat CBC     5. Annual physical exam  Update tdap, covid, flu this fall  - ECG 12 lead (Clinic Performed)   - update bone density  - repeat Cardiac Calcium score next year     Judie Woods MD

## 2024-09-06 ENCOUNTER — APPOINTMENT (OUTPATIENT)
Dept: PRIMARY CARE | Facility: CLINIC | Age: 64
End: 2024-09-06
Payer: COMMERCIAL

## 2024-09-06 VITALS
BODY MASS INDEX: 18.77 KG/M2 | SYSTOLIC BLOOD PRESSURE: 107 MMHG | HEART RATE: 77 BPM | OXYGEN SATURATION: 98 % | HEIGHT: 62 IN | TEMPERATURE: 97.4 F | WEIGHT: 102 LBS | DIASTOLIC BLOOD PRESSURE: 72 MMHG

## 2024-09-06 DIAGNOSIS — Z78.0 ASYMPTOMATIC MENOPAUSE: ICD-10-CM

## 2024-09-06 DIAGNOSIS — H91.91 DECREASED HEARING OF RIGHT EAR: ICD-10-CM

## 2024-09-06 DIAGNOSIS — L08.9 SKIN INFECTION: ICD-10-CM

## 2024-09-06 DIAGNOSIS — F34.1 DYSTHYMIA: ICD-10-CM

## 2024-09-06 DIAGNOSIS — Z00.00 ANNUAL PHYSICAL EXAM: Primary | ICD-10-CM

## 2024-09-06 RX ORDER — SERTRALINE HYDROCHLORIDE 50 MG/1
50 TABLET, FILM COATED ORAL
Qty: 90 TABLET | Refills: 0 | Status: SHIPPED | OUTPATIENT
Start: 2024-09-06 | End: 2024-12-05

## 2024-09-06 RX ORDER — MUPIROCIN 20 MG/G
OINTMENT TOPICAL 3 TIMES DAILY
Qty: 22 G | Refills: 0 | Status: SHIPPED | OUTPATIENT
Start: 2024-09-06 | End: 2024-09-16

## 2024-09-06 ASSESSMENT — PAIN SCALES - GENERAL: PAINLEVEL: 0-NO PAIN

## 2024-09-06 NOTE — PATIENT INSTRUCTIONS
You are doing well.    I agree with keeping you on the sertraline every other day  You had some small lymph nodes in your groin which appear normal on exam and on the MRI of your pelvis   Let's recheck them in 6 months and will recheck your lab work at that time.   (Not fasting).  If you feel any of them are increasing please come in sooner     You are due to update your tetanus ( Tdap)  I would advise you do update your Covid booster   Be sure to get the Flu shot this year.     I wrote a prescription antibiotic ointment for your surgical incision  Will update you  bone Bone Density this year   Will update your cardiac calcium score next year   Will have you go to audiology this year to check your hearing

## 2024-09-25 ENCOUNTER — APPOINTMENT (OUTPATIENT)
Dept: AUDIOLOGY | Facility: CLINIC | Age: 64
End: 2024-09-25
Payer: COMMERCIAL

## 2024-10-10 ENCOUNTER — CLINICAL SUPPORT (OUTPATIENT)
Dept: AUDIOLOGY | Facility: CLINIC | Age: 64
End: 2024-10-10
Payer: COMMERCIAL

## 2024-10-10 DIAGNOSIS — H90.41 SENSORINEURAL HEARING LOSS (SNHL) OF RIGHT EAR WITH UNRESTRICTED HEARING OF LEFT EAR: Primary | ICD-10-CM

## 2024-10-10 PROCEDURE — 92567 TYMPANOMETRY: CPT

## 2024-10-10 PROCEDURE — 92557 COMPREHENSIVE HEARING TEST: CPT

## 2024-10-10 ASSESSMENT — PAIN - FUNCTIONAL ASSESSMENT: PAIN_FUNCTIONAL_ASSESSMENT: 0-10

## 2024-10-10 ASSESSMENT — PAIN SCALES - GENERAL: PAINLEVEL_OUTOF10: 0 - NO PAIN

## 2024-10-10 NOTE — PROGRESS NOTES
AUDIOLOGIC EVALUATION      Name:  Lizzeth Funk  :  1960  Age:  63 y.o.  Date of Evaluation:  10/10/2024    Time: 3405-7158    HISTORY:  Lizzeth Funk, 63 y.o., was seen for an audiologic assessment at the request of her primary care physician. She reported difficulty hearing in background noise. She has a noticeable difference between her right and left ear, right ear poorer. She has a history of right TM perforation following an incidence of otitis media. She denied otalgia, otorrhea, tinnitus, dizziness, aural pressure/fullness, history of noise exposure, history of otologic surgeries, family history of hearing loss, and recent falls.       RESULTS:  Otoscopic Evaluation:  Right Ear: Unremarkable  Left Ear: Unremarkable    Immittance Measures:  Right Ear: Type A -- indicating normal volume, pressure, and static compliance  Left Ear: Type A -- indicating normal volume, pressure, and static compliance    Acoustic Reflexes:  Right Ear: (Ipsilateral) Responses present 500-4000 Hz.  Left Ear: (Ipsilateral) Responses present 500-4000 Hz.    Pure Tone Audiometry:    Right Ear: Hearing within normal limits 125-500 Hz sloping to a moderate sensorineural hearing loss at 8000 Hz    Left Ear: Hearing essentially within normal limits 125-8000 Hz    Asymmetry: Yes, right ear poorer 5386-4498 Hz    Reliability:   Good    Speech Audiometry:   Right: Speech Reception Threshold (SRT) was obtained at 40 dBHL.   SRT/PTA in Good agreement with pure tone average.    Left: Speech Reception Threshold (SRT) was obtained at 25 dBHL.   SRT/PTA in Good agreement with pure tone average.    Word Recognition Scores (WRS):  Right Ear: excellent (100%) in quiet when words were presented at 80 dBHL w/ masking.   Left Ear: excellent (100%) in quiet when words were presented at 65 dBHL.     Asymmetry: No      IMPRESSIONS:  Today's testing revealed normal ear canal volume, middle ear pressure, and tympanic membrane compliance. She  has hearing within normal limits sloping to a moderate sensorineural hearing loss in the right ear. In the left ear she has hearing essentially within normal limits. She has a noted asymmetry, right ear poorer. She has excellent word recognition in both ears. The results were discussed with the patient. She should follow-up with ENT for further evaluation of her noted asymmetry and to rule out retrocochlear involvement of noted asymmetry. She can schedule a hearing aid consultation pending patient interest and medical clearance.       RECOMMENDATIONS:  1.) Schedule an evaluation with an Ears Nose and Throat (ENT) provider to address asymmetric hearing loss, right ear poorer. For ENT scheduling, call [phone].   2.) Return for audiologic evaluation annually to monitor hearing sensitivity and assess middle ear status or sooner should concerns arise. The audiology department can be reached at [phone] to schedule an appointment.   3.) Consider amplification pending medical clearance. Check insurance for hearing aid benefits and in-network providers. To schedule a hearing aid consultation with [facility] audiology department, call [phone]. Patient was provided with insurance verification worksheet and a copy of today's audiogram.      Veronique Clifton, CCC-A  Clinical Audiologist        KEY  SNHL Sensorineural Hearing Loss   CHL Conductive Hearing Loss   MHL Mixed Hearing Loss   SSNHL Sudden Sensorineural Hearing Loss   WNL Within Normal Limits   PTA Pure Tone Average   TM Tympanic Membrane   ECV Ear Canal Volume   SRT Speech Reception Threshold   WRS Word Recognition Score

## 2024-10-18 ENCOUNTER — TELEPHONE (OUTPATIENT)
Dept: PRIMARY CARE | Facility: CLINIC | Age: 64
End: 2024-10-18
Payer: COMMERCIAL

## 2024-10-18 DIAGNOSIS — Z71.84 TRAVEL ADVICE ENCOUNTER: Primary | ICD-10-CM

## 2024-10-18 RX ORDER — SCOLOPAMINE TRANSDERMAL SYSTEM 1 MG/1
1 PATCH, EXTENDED RELEASE TRANSDERMAL
Qty: 4 PATCH | Refills: 0 | Status: SHIPPED | OUTPATIENT
Start: 2024-10-18 | End: 2024-12-17

## 2024-10-18 NOTE — TELEPHONE ENCOUNTER
Harsh Funk:     Damian Dimas. So I am going to [place] on a cruise next Monday 10/28. I have been on a cruise before but it’s been 10 years. Should I be taking sea sick meds with me just in case? I did last time but never needed them. And if I should will they interfere with my sertraline? Thanks!    Judie Woods:   Not a bad idea to have them with you   It would be fine with the sertraline     Harsh Funk:   I can’t find the prescription pills, besides they would be . Could you write another one?     Judie Woods:   No problem   I was actually thinking of calling you in the patch    For sea sickness     Harsh Funk:   OK sounds perfect. Cruise is 12 days long. Thank you!    Judie Woods:   I’m out of the office today but will be on my computer later today    Harsh Funk:   Thanks!!   Wally Orozco(Attending)

## 2024-11-01 ENCOUNTER — APPOINTMENT (OUTPATIENT)
Dept: RADIOLOGY | Facility: CLINIC | Age: 64
End: 2024-11-01
Payer: COMMERCIAL

## 2024-11-17 NOTE — PROGRESS NOTES
Reason for Consult:  New Patient Visit (Had hearing test/Significant hearing loss in right ear )     Subjective   History Of Present Illness:  Lizzeth Funk is a 63 y.o. female with right-sided normal downsloping to moderate sensorineural hearing loss presenting as referral from audiology.  She was initially seen by audiology as requested by PCP reporting difficulty hearing in background noise on the right side.  She does have a history of right sided TM perforation following an incidence of otitis media during her pregnancy which was many years ago as well as a another episode many years ago of probable tympanic membrane rupture on the right side during altitude change in a plane.      Denies any otalgia, otorrhea, tinnitus, dizziness, aural fullness, history of significant noise exposure, history of ear surgery, familial hearing loss, or recent trauma.  Denies radiation exposure or use of long-term IV antibiotics.       Past Medical History:  She has a past medical history of Basal cell carcinoma of skin of lip (05/16/2023), Basal cell carcinoma of skin of scalp and neck (05/16/2023), and Dysthymic disorder.    Surgical History:  She has a past surgical history that includes Skin cancer excision; [place] tooth extraction; and Dilation and curettage of uterus.     Social History:  She reports that she has never smoked. She has never been exposed to tobacco smoke. She has never used smokeless tobacco. No history on file for alcohol use and drug use.    Family History:  family history includes BRCA1 Negative in her mother; Breast cancer (age of onset: 52) in her mother; Coronary artery disease in her mother; Dementia in her mother; Diabetes type II in her paternal grandmother; Frontotemporal dementia in her father; Hyperlipidemia in her mother; Hypertension in her mother; No Known Problems in her brother, sister, and sister; Osteoporosis in her mother; Stroke in her father.     Medications:  Current  "Outpatient Medications   Medication Instructions    calcium carbonate (CALCIUM 600 ORAL) oral    fluorouracil (Efudex) 5 % cream APPLY 1-2 TIMES A DAY DAILY TO ACTINIC KERATOSES FOR 2-4 WEEKS AS TOLERATED. WASH OFF IN AM.    L. acidophilus/Bifid. animalis 32 billion cell capsule Take by mouth.    multivit-iron-FA-calcium-mins (One-A-Day Womens Formula) 18 mg iron-400 mcg-500 mg Ca tablet Take by mouth.    scopolamine (Transderm-Scop) 1 mg over 3 days patch 3 day 1 patch, transdermal, Every 72 hours PRN    sertraline (ZOLOFT) 50 mg, oral, Daily RT      Allergies:  Patient has no known allergies.    Review of Systems:   A comprehensive 10-point review of systems was obtained including constitutional, neurological, HEENT, pulmonary, cardiovascular, genito-urinary, and other pertinent systems and was negative except as noted in the HPI.     Objective   Physical Exam:  Last Recorded Vitals: Height 1.575 m (5' 2\"), weight 47.6 kg (105 lb).    On physical exam, the patient is a well-nourished, well-developed patient, in no acute distress, able to communicate without assistance in English language. Head and face is atraumatic and normocephalic. Salivary glands are intact. Facial strength is symmetrical bilaterally.       On ear examination:  Right ear: The patient has an open and patent ear canal. The tympanic membrane is intact.  AC>BC  Left ear: The patient has an open and patent ear canal. The tympanic membrane is intact.  AC>BC  The Rivera is midline    On vestibular exam, the patient has no spontaneous nystagmus, no headshake nystagmus, no head-thrust nystagmus, and no nystagmus on hyperventilation or Valsalva maneuvers. [place]-Hallpike maneuver is negative bilaterally.       On neuro exam, the patient is alert and oriented x3, cranial nerves are grossly intact, cerebellar exam is normal.      The rest of the exam, including anterior rhinoscopy, oropharyngeal exam, neck exam, and cardiovascular exam, were normal including " no palpable lymphadenopathies, thyroid in the midline position, normal pulses, and normal chest excursion.       Reviewed Results:  Audiology Testing:  I personally reviewed the audiogram from 10/10/2024 that showed:   Right ear: Normal downsloping to moderate SNHL. Discrimination: 100%   Left ear: Essentially normal hearing. Discrimination: 100%          Imaging:  No imaging related to chief complaint available for personal review     Procedure:  None    Assessment/Plan     1. Sensorineural hearing loss (SNHL) of right ear with unrestricted hearing of left ear    2. Asymmetrical hearing loss        In summary, Lizzeth Funk is a 63 y.o. female with unilateral asymmetric right-sided SNHL in the absence of other otologic symptoms.  Given asymmetric SNHL, imaging is warranted to rule out retrocochlear pathology.     -Follow-up results of MRI IAC  -Pending medical clearance, patient may be scheduled for hearing aid evaluation which she wants to pursue         ____________________________________________________  Don Liu MD  Professor and Chief   Otology/Neurotology/Lateral Skull-Base Surgery   [facility]

## 2024-11-18 ENCOUNTER — APPOINTMENT (OUTPATIENT)
Dept: OTOLARYNGOLOGY | Facility: CLINIC | Age: 64
End: 2024-11-18
Payer: COMMERCIAL

## 2024-11-18 VITALS — HEIGHT: 62 IN | BODY MASS INDEX: 19.32 KG/M2 | WEIGHT: 105 LBS

## 2024-11-18 DIAGNOSIS — H90.41 SENSORINEURAL HEARING LOSS (SNHL) OF RIGHT EAR WITH UNRESTRICTED HEARING OF LEFT EAR: Primary | ICD-10-CM

## 2024-11-18 DIAGNOSIS — H91.8X3 ASYMMETRICAL HEARING LOSS: ICD-10-CM

## 2024-11-18 PROCEDURE — 99204 OFFICE O/P NEW MOD 45 MIN: CPT | Performed by: OTOLARYNGOLOGY

## 2024-11-18 PROCEDURE — 3008F BODY MASS INDEX DOCD: CPT | Performed by: OTOLARYNGOLOGY

## 2024-11-18 ASSESSMENT — PATIENT HEALTH QUESTIONNAIRE - PHQ9
2. FEELING DOWN, DEPRESSED OR HOPELESS: NOT AT ALL
SUM OF ALL RESPONSES TO PHQ9 QUESTIONS 1 AND 2: 0
1. LITTLE INTEREST OR PLEASURE IN DOING THINGS: NOT AT ALL

## 2024-11-18 NOTE — LETTER
November 28, 2024     Judie Woods MD  [address] [facility]  [place] [address]    Patient: Lizzeth Funk   YOB: 1960   Date of Visit: 11/18/2024       Dear Dr. Judie Woods MD:    Thank you for referring Lizzeth Funk to me for evaluation. Below are my notes for this consultation.  If you have questions, please do not hesitate to call me. I look forward to following your patient along with you.       Sincerely,     Don Smiley MD      CC: Lucian Ribera MD  ______________________________________________________________________________________            Reason for Consult:  New Patient Visit (Had hearing test/Significant hearing loss in right ear )     Subjective  History Of Present Illness:  Lizzeth Funk is a 63 y.o. female with right-sided normal downsloping to moderate sensorineural hearing loss presenting as referral from audiology.  She was initially seen by audiology as requested by PCP reporting difficulty hearing in background noise on the right side.  She does have a history of right sided TM perforation following an incidence of otitis media during her pregnancy which was many years ago as well as a another episode many years ago of probable tympanic membrane rupture on the right side during altitude change in a plane.      Denies any otalgia, otorrhea, tinnitus, dizziness, aural fullness, history of significant noise exposure, history of ear surgery, familial hearing loss, or recent trauma.  Denies radiation exposure or use of long-term IV antibiotics.       Past Medical History:  She has a past medical history of Basal cell carcinoma of skin of lip (05/16/2023), Basal cell carcinoma of skin of scalp and neck (05/16/2023), and Dysthymic disorder.    Surgical History:  She has a past surgical history that includes Skin cancer excision; [place] tooth extraction; and Dilation and curettage of uterus.     Social  "History:  She reports that she has never smoked. She has never been exposed to tobacco smoke. She has never used smokeless tobacco. No history on file for alcohol use and drug use.    Family History:  family history includes BRCA1 Negative in her mother; Breast cancer (age of onset: 52) in her mother; Coronary artery disease in her mother; Dementia in her mother; Diabetes type II in her paternal grandmother; Frontotemporal dementia in her father; Hyperlipidemia in her mother; Hypertension in her mother; No Known Problems in her brother, sister, and sister; Osteoporosis in her mother; Stroke in her father.     Medications:  Current Outpatient Medications   Medication Instructions   • calcium carbonate (CALCIUM 600 ORAL) oral   • fluorouracil (Efudex) 5 % cream APPLY 1-2 TIMES A DAY DAILY TO ACTINIC KERATOSES FOR 2-4 WEEKS AS TOLERATED. WASH OFF IN AM.   • L. acidophilus/Bifid. animalis 32 billion cell capsule Take by mouth.   • multivit-iron-FA-calcium-mins (One-A-Day Womens Formula) 18 mg iron-400 mcg-500 mg Ca tablet Take by mouth.   • scopolamine (Transderm-Scop) 1 mg over 3 days patch 3 day 1 patch, transdermal, Every 72 hours PRN   • sertraline (ZOLOFT) 50 mg, oral, Daily RT      Allergies:  Patient has no known allergies.    Review of Systems:   A comprehensive 10-point review of systems was obtained including constitutional, neurological, HEENT, pulmonary, cardiovascular, genito-urinary, and other pertinent systems and was negative except as noted in the HPI.     Objective  Physical Exam:  Last Recorded Vitals: Height 1.575 m (5' 2\"), weight 47.6 kg (105 lb).    On physical exam, the patient is a well-nourished, well-developed patient, in no acute distress, able to communicate without assistance in English language. Head and face is atraumatic and normocephalic. Salivary glands are intact. Facial strength is symmetrical bilaterally.       On ear examination:  Right ear: The patient has an open and patent ear " canal. The tympanic membrane is intact.  AC>BC  Left ear: The patient has an open and patent ear canal. The tympanic membrane is intact.  AC>BC  The Rivera is midline    On vestibular exam, the patient has no spontaneous nystagmus, no headshake nystagmus, no head-thrust nystagmus, and no nystagmus on hyperventilation or Valsalva maneuvers. [place]-Hallpike maneuver is negative bilaterally.       On neuro exam, the patient is alert and oriented x3, cranial nerves are grossly intact, cerebellar exam is normal.      The rest of the exam, including anterior rhinoscopy, oropharyngeal exam, neck exam, and cardiovascular exam, were normal including no palpable lymphadenopathies, thyroid in the midline position, normal pulses, and normal chest excursion.       Reviewed Results:  Audiology Testing:  I personally reviewed the audiogram from 10/10/2024 that showed:   Right ear: Normal downsloping to moderate SNHL. Discrimination: 100%   Left ear: Essentially normal hearing. Discrimination: 100%          Imaging:  No imaging related to chief complaint available for personal review     Procedure:  None    Assessment/Plan    1. Sensorineural hearing loss (SNHL) of right ear with unrestricted hearing of left ear    2. Asymmetrical hearing loss        In summary, Lizzeth Funk is a 63 y.o. female with unilateral asymmetric right-sided SNHL in the absence of other otologic symptoms.  Given asymmetric SNHL, imaging is warranted to rule out retrocochlear pathology.     -Follow-up results of MRI IAC  -Pending medical clearance, patient may be scheduled for hearing aid evaluation which she wants to pursue         ____________________________________________________  Don Liu MD  Professor and Chief   Otology/Neurotology/Lateral Skull-Base Surgery   [facility]

## 2024-11-19 ENCOUNTER — HOSPITAL ENCOUNTER (OUTPATIENT)
Dept: RADIOLOGY | Facility: CLINIC | Age: 64
Discharge: HOME | End: 2024-11-19
Payer: COMMERCIAL

## 2024-11-19 DIAGNOSIS — Z78.0 ASYMPTOMATIC MENOPAUSE: ICD-10-CM

## 2024-11-19 PROCEDURE — 77080 DXA BONE DENSITY AXIAL: CPT | Performed by: RADIOLOGY

## 2024-11-19 PROCEDURE — 77080 DXA BONE DENSITY AXIAL: CPT

## 2024-12-02 ENCOUNTER — APPOINTMENT (OUTPATIENT)
Dept: RADIOLOGY | Facility: CLINIC | Age: 64
End: 2024-12-02
Payer: COMMERCIAL

## 2024-12-09 ENCOUNTER — OFFICE VISIT (OUTPATIENT)
Dept: PRIMARY CARE | Facility: CLINIC | Age: 64
End: 2024-12-09
Payer: COMMERCIAL

## 2024-12-09 ENCOUNTER — APPOINTMENT (OUTPATIENT)
Dept: OTOLARYNGOLOGY | Facility: CLINIC | Age: 64
End: 2024-12-09
Payer: COMMERCIAL

## 2024-12-09 VITALS
TEMPERATURE: 98 F | OXYGEN SATURATION: 97 % | HEART RATE: 74 BPM | SYSTOLIC BLOOD PRESSURE: 97 MMHG | DIASTOLIC BLOOD PRESSURE: 67 MMHG

## 2024-12-09 DIAGNOSIS — J40 BRONCHITIS: Primary | ICD-10-CM

## 2024-12-09 PROCEDURE — 99213 OFFICE O/P EST LOW 20 MIN: CPT | Performed by: INTERNAL MEDICINE

## 2024-12-09 RX ORDER — AZITHROMYCIN 250 MG/1
TABLET, FILM COATED ORAL
Qty: 6 TABLET | Refills: 0 | Status: SHIPPED | OUTPATIENT
Start: 2024-12-09 | End: 2024-12-14

## 2024-12-09 RX ORDER — HYDROCODONE BITARTRATE AND HOMATROPINE METHYLBROMIDE 5; 1.5 MG/5ML; MG/5ML
5 SOLUTION ORAL EVERY 6 HOURS PRN
Qty: 100 ML | Refills: 0 | Status: SHIPPED | OUTPATIENT
Start: 2024-12-09 | End: 2024-12-14

## 2024-12-09 NOTE — PROGRESS NOTES
Subjective   Patient ID: Lizzeth Funk is a 63 y.o. female who presents for URI.    URI      9 day history of URI symptoms . Started as a wet cough. It has mostly become dry but still slightly productive in the am.  She continues to feel very fatigued and coughing through the night.    Yesterday awoke with a sore throat.  First cough can be productive  No nasal congestion.  Past couple of days   Hot and cold and headache.      Review of Systems    Objective   BP 97/67 (BP Location: Left arm, Patient Position: Sitting)   Pulse 74   Temp 36.7 °C (98 °F)   SpO2 97%     Physical Exam  Vitals reviewed.   Constitutional:       General: She is not in acute distress.     Appearance: Normal appearance. She is not ill-appearing or toxic-appearing.   HENT:      Right Ear: Tympanic membrane and external ear normal.      Left Ear: Tympanic membrane and external ear normal.      Mouth/Throat:      Mouth: Mucous membranes are moist.      Pharynx: No oropharyngeal exudate or posterior oropharyngeal erythema.   Eyes:      Conjunctiva/sclera: Conjunctivae normal.   Cardiovascular:      Rate and Rhythm: Normal rate and regular rhythm.      Heart sounds: No murmur heard.     No gallop.   Pulmonary:      Effort: Pulmonary effort is normal. No respiratory distress.      Breath sounds: Normal breath sounds. No wheezing, rhonchi or rales.   Musculoskeletal:         General: Normal range of motion.   Lymphadenopathy:      Head:      Right side of head: No submandibular adenopathy.      Left side of head: No submandibular adenopathy.      Cervical: No cervical adenopathy.   Skin:     Findings: No rash.   Neurological:      General: No focal deficit present.      Mental Status: She is alert.         Assessment/Plan   Problem List Items Addressed This Visit    None  Visit Diagnoses         Codes    Bronchitis    -  Primary J40    Relevant Medications    azithromycin (Zithromax) 250 mg tablet    hydrocodone-homatropine (Hycodan) 5-1.5  mg/5 mL syrup          Follow up for no improvement or any worsening  or new concerns

## 2024-12-16 ENCOUNTER — APPOINTMENT (OUTPATIENT)
Dept: OTOLARYNGOLOGY | Facility: CLINIC | Age: 64
End: 2024-12-16
Payer: COMMERCIAL

## 2024-12-16 ENCOUNTER — HOSPITAL ENCOUNTER (OUTPATIENT)
Dept: RADIOLOGY | Facility: CLINIC | Age: 64
Discharge: HOME | End: 2024-12-16
Payer: COMMERCIAL

## 2024-12-16 DIAGNOSIS — H91.8X3 ASYMMETRICAL HEARING LOSS: ICD-10-CM

## 2024-12-16 PROCEDURE — 70553 MRI BRAIN STEM W/O & W/DYE: CPT | Performed by: RADIOLOGY

## 2024-12-16 PROCEDURE — 70553 MRI BRAIN STEM W/O & W/DYE: CPT

## 2024-12-16 PROCEDURE — 2550000001 HC RX 255 CONTRASTS: Performed by: OTOLARYNGOLOGY

## 2024-12-16 PROCEDURE — A9575 INJ GADOTERATE MEGLUMI 0.1ML: HCPCS | Performed by: OTOLARYNGOLOGY

## 2024-12-16 RX ORDER — GADOTERATE MEGLUMINE 376.9 MG/ML
9 INJECTION INTRAVENOUS
Status: COMPLETED | OUTPATIENT
Start: 2024-12-16 | End: 2024-12-16

## 2024-12-17 ENCOUNTER — APPOINTMENT (OUTPATIENT)
Dept: DERMATOLOGY | Facility: CLINIC | Age: 64
End: 2024-12-17
Payer: COMMERCIAL

## 2024-12-21 DIAGNOSIS — F34.1 DYSTHYMIA: ICD-10-CM

## 2024-12-21 RX ORDER — SERTRALINE HYDROCHLORIDE 50 MG/1
50 TABLET, FILM COATED ORAL DAILY
Qty: 90 TABLET | Refills: 3 | Status: SHIPPED | OUTPATIENT
Start: 2024-12-21 | End: 2025-12-21

## 2024-12-22 NOTE — RESULT ENCOUNTER NOTE
I reviewed the MRI from 12/2024 which shows no evidence of retrocochlear pathology.  Overall the MRI is normal.

## 2025-01-21 ENCOUNTER — APPOINTMENT (OUTPATIENT)
Dept: DERMATOLOGY | Facility: CLINIC | Age: 65
End: 2025-01-21
Payer: COMMERCIAL

## 2025-02-03 ENCOUNTER — APPOINTMENT (OUTPATIENT)
Dept: AUDIOLOGY | Facility: CLINIC | Age: 65
End: 2025-02-03
Payer: COMMERCIAL

## 2025-02-03 DIAGNOSIS — H90.41 SENSORINEURAL HEARING LOSS (SNHL) OF RIGHT EAR WITH UNRESTRICTED HEARING OF LEFT EAR: Primary | ICD-10-CM

## 2025-02-03 ASSESSMENT — PAIN SCALES - GENERAL: PAINLEVEL_OUTOF10: 0 - NO PAIN

## 2025-02-03 ASSESSMENT — PAIN - FUNCTIONAL ASSESSMENT: PAIN_FUNCTIONAL_ASSESSMENT: 0-10

## 2025-02-11 NOTE — PROGRESS NOTES
AUDIOLOGY HEARING AID EVALUATION      Name:  Lizzeth Funk  :  1960  Age:  64 y.o.  Date of Encounter:  2/3/2025     Time: 7271-1384      HISTORY:  Ms. Funk was seen today for a hearing aid consultation. A previous hearing evaluation on 10/10/24 indicated hearing within normal limits sloping to a moderate sensorineural hearing loss in the right ear. In the left ear she has hearing essentially within normal limits. She has a noted asymmetry, right ear poorer. She has excellent word recognition in both ears. She was medically cleared for hearing aid use on 24.         IMPRESSIONS:  The hearing test from 10/10/24 was reviewed with the patient. They are a hearing aid candidate in the right ear. The benefits and drawbacks of different hearing aid styles and levels of technology were reviewed. The patient decided to purchase one rechargeable -in-the-Ear (RITE) style hearing aids in the color P1, with the advanced level technology, with a 0S   and small open domes for the right ear. Pricing and policies were reviewed. Basic hearing aid use and parts were discussed.    Hearing Aid Information Right    Phonak   Model Audéo I70-R    0S   Dome Small open     The hearing aids will be ordered from the  and payment will be completed at time of fitting.       RECOMMENDATIONS:  1.) Return for scheduled hearing aid fitting.  2.) Contact your audiologist with any questions or concerns prior to fitting.       YANELI Juarez, CCC-A   Clinical Audiologist

## 2025-02-18 ENCOUNTER — APPOINTMENT (OUTPATIENT)
Dept: AUDIOLOGY | Facility: CLINIC | Age: 65
End: 2025-02-18
Payer: COMMERCIAL

## 2025-02-18 ENCOUNTER — APPOINTMENT (OUTPATIENT)
Dept: DERMATOLOGY | Facility: CLINIC | Age: 65
End: 2025-02-18
Payer: COMMERCIAL

## 2025-02-18 VITALS — DIASTOLIC BLOOD PRESSURE: 68 MMHG | SYSTOLIC BLOOD PRESSURE: 101 MMHG | HEART RATE: 67 BPM

## 2025-02-18 DIAGNOSIS — C44.712 BASAL CELL CARCINOMA (BCC) OF SKIN OF RIGHT LOWER EXTREMITY INCLUDING HIP: ICD-10-CM

## 2025-02-18 PROCEDURE — 99214 OFFICE O/P EST MOD 30 MIN: CPT | Performed by: DERMATOLOGY

## 2025-02-18 PROCEDURE — 17313 MOHS 1 STAGE T/A/L: CPT | Performed by: DERMATOLOGY

## 2025-02-18 PROCEDURE — 17314 MOHS ADDL STAGE T/A/L: CPT | Performed by: DERMATOLOGY

## 2025-02-18 NOTE — PROGRESS NOTES
Mohs Surgery Operative Note    Date of Surgery:  2/18/2025  Surgeon:  Isaias Carrington MD  Office Location: [address]  [facility] [address]  Dept: [phone]  Dept Fax: [phone]  Referring Provider: Lucian Ribera MD  [address] [place] [address] [address]  [place] [address]      Assessment/Plan   Pre-procedure:   Obtained informed consent: written from patient  The surgical site was identified and confirmed with the patient.     Intra-operative:   Audible time out called at : 8:38 AM 02/18/25  by: Samantha Dalton MA   Verified patient name, birthdate, site, specimen bottle label & requisition.    The planned procedure(s) was again reviewed with the patient. The risks of bleeding, infection, nerve damage and scarring were reviewed. Written authorization was obtained. The patient identity, surgical site, and planned procedure(s) were verified. The provider acted as both surgeon and pathologist.     Basal cell carcinoma (BCC) of skin of right lower extremity including hip  Right Posterior Ankle  Mohs surgery  Consent obtained: written    Universal Protocol:  Procedure explained and questions answered to patient or proxy's satisfaction: Yes    Test results available and properly labeled: Yes    Pathology report reviewed: Yes    External notes reviewed: Yes    Photo or diagram used for site identification: Yes    Site/side marked: Yes    Slide independently reviewed by Mohs surgeon: Yes    Immediately prior to procedure a time out was called: Yes    Patient identity confirmed: verbally with patient  Preparation: Patient was prepped and draped in usual sterile fashion      Anticoagulation:  Is the patient taking prescription anticoagulant and/or aspirin prescribed/recommended by a physician? No    Was the anticoagulation regimen changed prior to Mohs? No      Anesthesia:  Anesthesia method: local infiltration  Local anesthetic: lidocaine 2% WITH  epi    Procedure Details:  Case ID Number: [id number]  Biopsy accession number: [id number](outside path)  Date of biopsy: 9/16/2024  Pre-Op diagnosis: basal cell carcinoma  BCC subtype: superficial  Surgical site (from skin exam): Right Posterior Ankle  Pre-operative length (cm): 2.1  Pre-operative width (cm): 2  Indications for Mohs surgery: ill-defined borders, large size (>2cm)  Previously treated? No      Micrographic Surgery Details:  Post-operative length (cm): 2.2  Post-operative width (cm): 2.2  Number of Mohs stages: 2    Stage 1     Comments: The patient was brought into the operating room and placed in the procedure chair in the appropriate position.  The area positive by previous biopsy was identified and confirmed with the patient. The area of clinically obvious tumor was debulked using a curette and/or scalpel as needed. An incision was made following the Mohs approach through the skin. The specimen was taken to the lab, divided into 2 piece(s) and appropriately chromacoded and processed.      Tumor was seen on the lateral margins as indicated on the on the Mohs map.  Superficial basal cell carcinoma. Histologic examination revealed small buds of atypical basaloid cells with peripheral palisading and tumoral clefting.    Tumor features identified on Mohs section: basal carcinoma   Depth of invasion: Epidermis    Stage 2     Comments: The area of positivity as noted on the Mohs map in the previous stage was identified and removed using the Mohs technique. The specimen was taken to the lab and appropriately chromacoded and processed in 1 piece(s).       Tumor features identified on Mohs section: no tumor identified  Depth of defect: subcutaneous fat    Patient tolerance of procedure: tolerated well, no immediate complications    Reconstruction:  Was the defect reconstructed?: No     Repair: After a discussion with the patient regarding the options for wound closure, a decision was made to proceed with second  intention healing.    Dressing/Follow-up: Surgifoam was placed in the wound. A pressure dressing was placed to help stabilize the wound and to minimize the risk of postoperative bleeding. Wound care was discussed, and the patient was given written post-operative wound care instructions.        Fine/surface layer approximation (top stitches)   Hemostasis achieved with: pressure, Gelfoam and electrodesiccation  Outcome: patient tolerated procedure well with no complications    Post-procedure details: sterile dressing applied and wound care instructions given    Dressing type: pressure dressing        The final repair measured 2.1 x 2.0 cm          Wound care was discussed, and the patient was given written post-operative wound care instructions.      The patient will follow up with Isaias Carrington MD as needed for any post operative problems or concerns, and will follow up with their primary dermatologist as scheduled.

## 2025-02-18 NOTE — PROGRESS NOTES
Office Visit Note  Date: 2/18/2025  Surgeon:  Isaias Carrington MD  Office Location:  [address]  [facility] [address]  Dept: [phone]  Dept Fax: [phone]  Referring Provider: Lucian Ribera MD  [address] [place] [address] [address]  [place] [address]    Subjective   Lizzeth Funk is a 64 y.o. female who presents for the following: MOHS Surgery    According to the patient, the lesion has been present for approximately 6 months at the time of diagnosis.  The lesion is not causing symptoms.  The lesion has not been treated previously.    The patient does not have a pacemaker / defibrillator.  The patient does not have a heart valve / joint replacement.    The patient is not on blood thinners.  The patient does not have a history of hepatitis B or C.  The patient does not have a history of HIV.  The patient does not have a history of immunosuppression (e.g. organ transplantation, malignancy, medications)    Review of Systems:  No other skin or systemic complaints other than what is documented elsewhere in the note.    MEDICAL HISTORY: clinically relevant history including significant past medical history, medications and allergies was reviewed and documented in Epic.    Objective   Well appearing patient in no apparent distress; mood and affect are within normal limits.  Vital signs: See record.  Noted on the Right Posterior Ankle  Is a 2.0 x 2.0 cm scar        The patient confirmed the identified site.    Discussion:  The nature of the diagnosis was explained. The lesion is a skin cancer.  It has a risk of local growth and distant spread. The condition is associated with sun exposure.  Warning signs of non-melanoma skin cancer discussed. Patient was instructed to perform monthly self skin examination.  We recommended that the patient have regular full skin exams given an increased risk of subsequent skin cancers. The patient was instructed to use sun  protective behaviors including use of broad spectrum sunscreens and sun protective clothing to reduce risk of skin cancers.      Risks, benefits, side effects of Mohs surgery were discussed with patient and the patient voiced understanding.  It was explained that even though the cure rate of Mohs is very high it is not 100%. Risks of surgery including but not limited to bleeding, infection, numbness, nerve damage, and scar were reviewed.  Discussion included wound care requirements, activity restrictions, likely scar outcome and time to heal.     After Mohs surgery, the defect may need to be repaired surgically and the scar may be longer than the original lesion.  Reconstruction options, risks, and benefits were reviewed including second intention healing, linear repair (4-1 ratio was explained), local flaps, skin grafts, cartilage grafts and interpolation flaps (the need for multiple surgeries was explained). Possible outcomes were reviewed including likely scar appearance, failure of flap survival, infection, bleeding and the need for revision surgery.     The pathology was reviewed, the photograph was reviewed, and the referring physician's note was reviewed.    Patient elected for Mohs surgery.    The patient has a basal cell carcinoma.  The pathology was reviewed, the photograph was reviewed, and the referring physicians note was reviewed.  Multiple treatment options including mohs surgery (which has moderate risk of morbidity) were reviewed.    Medical Decision Making  Column 1- Basal Cell Carcinoma (1 acute uncomplicated illness- Low)  Column 2- 3 tests reviewed (pathology, photograph, refering physician notes- Moderate)  Column 3- Modertate risk of morbidity from additional treatment- mohs surgery- Moderate)    Overall Moderate MDM

## 2025-02-18 NOTE — LETTER
MOH's Provider/Referral Letter Treatment Plan    Patient: Lizzeth Funk   YOB: 1960   Date of Visit: 2/18/2025   MRN: [id number]     Lucian Ribera MD  [facility] [facility] [address] [place] [address]    Dear Lucian Ribera MD,     I had the pleasure of seeing Lizzeth Funk today in consultation at your request for evaluation and treatment of:  1. Basal cell carcinoma (BCC) of skin of right lower extremity including hip  Right Posterior Ankle    Mohs surgery    Staff Communication: Dermatology Local Anesthesia: 2% lidocaine with Epinephrine 1:200,000 - Amount:3cc's      Mohs surgery was indicated because of the nature of the lesion and the need to obtain the highest cure rate.  After informed consent was obtained, the patient underwent the procedure without complication.    The skin cancer was removed, wound care instructions were given and the patient was advised to follow up with you.  I will see the patient post-operatively as indicated.    Thank you very much for your confidence in me and for allowing me to share in the care of this patient.    1. Basal cell carcinoma (BCC) of skin of right lower extremity including hip  Right Posterior Ankle  Is a 2.0 x 2.0 cm scar    Mohs surgery    Consent obtained: written    Universal Protocol:  Procedure explained and questions answered to patient or proxy's satisfaction: Yes    Test results available and properly labeled: Yes    Pathology report reviewed: Yes    External notes reviewed: Yes    Photo or diagram used for site identification: Yes    Site/side marked: Yes    Slide independently reviewed by Mohs surgeon: Yes    Immediately prior to procedure a time out was called: Yes    Patient identity confirmed: verbally with patient  Preparation: Patient was prepped and draped in usual sterile fashion      Anticoagulation:  Is the patient taking prescription anticoagulant and/or aspirin prescribed/recommended by a physician? No    Was the  anticoagulation regimen changed prior to Mohs? No      Anesthesia:  Anesthesia method: local infiltration  Local anesthetic: lidocaine 2% WITH epi    Procedure Details:  Case ID Number: [id number]  Biopsy accession number: [id number](outside path)  Date of biopsy: 9/16/2024  Pre-Op diagnosis: basal cell carcinoma  BCC subtype: superficial  Surgical site (from skin exam): Right Posterior Ankle  Pre-operative length (cm): 2  Pre-operative width (cm): 2  Indications for Mohs surgery: ill-defined borders  Previously treated? No      Micrographic Surgery Details:  Post-operative length (cm): 2.1  Post-operative width (cm): 2  Number of Mohs stages: 2    Stage 1     Comments: The patient was brought into the operating room and placed in the procedure chair in the appropriate position.  The area positive by previous biopsy was identified and confirmed with the patient. The area of clinically obvious tumor was debulked using a curette and/or scalpel as needed. An incision was made following the Mohs approach through the skin. The specimen was taken to the lab, divided into 2 piece(s) and appropriately chromacoded and processed.      Tumor was seen on the lateral margins as indicated on the on the Mohs map.  Superficial basal cell carcinoma. Histologic examination revealed small buds of atypical basaloid cells with peripheral palisading and tumoral clefting.             Tumor features identified on Mohs section: basal carcinoma      Depth of invasion: Epidermis    Stage 2     Comments: The area of positivity as noted on the Mohs map in the previous stage was identified and removed using the Mohs technique. The specimen was taken to the lab and appropriately chromacoded and processed in 1 piece(s).     Tumor features identified on Mohs section: no tumor identified    Depth of defect: subcutaneous fat    Patient tolerance of procedure: tolerated well, no immediate complications    Reconstruction:  Was the defect reconstructed?: No      Repair: After a discussion with the patient regarding the options for wound closure, a decision was made to proceed with second intention healing.    Dressing/Follow-up: Surgifoam was placed in the wound. A pressure dressing was placed to help stabilize the wound and to minimize the risk of postoperative bleeding. Wound care was discussed, and the patient was given written post-operative wound care instructions.        Fine/surface layer approximation (top stitches)   Hemostasis achieved with: pressure, Gelfoam and electrodesiccation  Outcome: patient tolerated procedure well with no complications    Post-procedure details: sterile dressing applied and wound care instructions given    Dressing type: pressure dressing      Staff Communication: Dermatology Local Anesthesia: 2% lidocaine with Epinephrine 1:200,000 - Amount:3cc's           Sincerely,       Isaias Carrington MD  [facility]

## 2025-02-20 ENCOUNTER — APPOINTMENT (OUTPATIENT)
Dept: AUDIOLOGY | Facility: CLINIC | Age: 65
End: 2025-02-20

## 2025-02-20 DIAGNOSIS — H90.41 SENSORINEURAL HEARING LOSS (SNHL) OF RIGHT EAR WITH UNRESTRICTED HEARING OF LEFT EAR: Primary | ICD-10-CM

## 2025-02-20 PROCEDURE — V5247 HEARING AID, PROG, MON, BTE: HCPCS

## 2025-02-20 PROCEDURE — V5241 DISPENSING FEE, MONAURAL: HCPCS

## 2025-02-20 ASSESSMENT — PAIN SCALES - GENERAL: PAINLEVEL_OUTOF10: 0 - NO PAIN

## 2025-02-20 ASSESSMENT — PAIN - FUNCTIONAL ASSESSMENT: PAIN_FUNCTIONAL_ASSESSMENT: 0-10

## 2025-03-03 DIAGNOSIS — D72.819 LEUKOPENIA, UNSPECIFIED TYPE: Primary | ICD-10-CM

## 2025-03-03 NOTE — PROGRESS NOTES
AUDIOLOGY HEARING AID FITTING      Name:  Lizzeth Funk   :  1960   Age:  64 y.o.   Date of Evaluation:  2025     Time: 4438-4594      HISTORY:  Lizzeth arrived today for hearing aid fitting. A previous hearing evaluation on 10/10/24 indicated hearing within normal limits sloping to a moderate sensorineural hearing loss in the right ear. In the left ear she has hearing essentially within normal limits. She has a noted asymmetry, right ear poorer. She has excellent word recognition in both ears. She was medically cleared for hearing aid use on 24.      HEARING AID:    Hearing Aid Information Right    Phonak   Model Audéo I70-R   Serial Number [id number]   /Tubing 0 S   Dome Small open   Retention No   Wax Traps Cerustop         OMID I    Serial Number [id number]   Warranty 3/12/28       Repair Warranty 3/12/28   Loss and Damage Warranty 3/12/28   Fitting Date 2025    Fitting Audiologist Veronique Clifton CCC-A  Clinical Audiologist       Paired to Phone for phone calls: No  Paired to smart phone application: Yes  Volume controls active: Yes  Fit to Audiogram performed on 10/10/24      HEARING AID ORIENTATION:  Low-battery and volume control signals were demonstrated for the patient.        PROGRAMMING, VERIFICATION, and VALIDATION MEASURES:  Measurements to account for unique size and shape of Ms. Lockes ears in hearing aid programming included: real ear measures. Adjustments were made based on measurement of soft (55 dB SPL), average (65 dB SPL), and loud (75 dB dB SPL) speech, as well as maximum permissible output (MPO), to ensure that Ms. Funk is being provided with the most appropriate amplification. The devices were found to meet prescriptive targets from 250-8000 Hz for the right ear.       VALIDATION MEASURE:  The Device Benefit Self-Assessment Scale was administered again today post-fitting. She marked majority of her responses were as  "\"sometimes\". She will complete this again post-fitting at her follow-up.      IMPRESSIONS:  Today's 1-hour hearing aid fitting appointment was spent discussing use, care, and maintenance of the hearing device. The patient was able to properly insert and remove the hearing instrument from the ear. In addition to today's verbal instruction of the hearing devices, the patient was given written instructions from the hearing aid . Hearing aid limitations were discussed at length as well as realistic expectations. The patient was advised in order to receive full benefit of amplification, consistent use during all waking hours is recommended. The repair warranty (coverage/cost from the hearing aid  and not the responsibility of [facility]) and the conditions of the 30 day right-to-return period (3/12/28 ) were discussed. Her insurance will be billed for the hearing aids to utilize her insurance benefit.       RECOMMENDATIONS:  Strive for full-time device use during waking hours, except when activities preclude device safety.  Return for hearing aid fitting follow-up appointment.       PATIENT EDUCATION:  Discussed results and recommendations with Ms. Funk. Questions were addressed and the patient was encouraged to contact our department at [phone] should concerns arise.       Veronique Clifton, CCC-A  Clinical Audiologist      "

## 2025-03-04 ENCOUNTER — APPOINTMENT (OUTPATIENT)
Dept: PRIMARY CARE | Facility: CLINIC | Age: 65
End: 2025-03-04
Payer: COMMERCIAL

## 2025-03-10 ENCOUNTER — APPOINTMENT (OUTPATIENT)
Dept: AUDIOLOGY | Facility: CLINIC | Age: 65
End: 2025-03-10
Payer: COMMERCIAL

## 2025-03-12 ENCOUNTER — APPOINTMENT (OUTPATIENT)
Dept: AUDIOLOGY | Facility: CLINIC | Age: 65
End: 2025-03-12
Payer: COMMERCIAL

## 2025-04-03 ENCOUNTER — LAB (OUTPATIENT)
Dept: LAB | Facility: HOSPITAL | Age: 65
End: 2025-04-03
Payer: COMMERCIAL

## 2025-04-03 DIAGNOSIS — D72.819 DECREASED WHITE BLOOD CELL COUNT, UNSPECIFIED: Primary | ICD-10-CM

## 2025-04-03 LAB
BASOPHILS # BLD AUTO: 0.04 X10*3/UL (ref 0–0.1)
BASOPHILS NFR BLD AUTO: 1 %
EOSINOPHIL # BLD AUTO: 0.09 X10*3/UL (ref 0–0.7)
EOSINOPHIL NFR BLD AUTO: 2.3 %
ERYTHROCYTE [DISTWIDTH] IN BLOOD BY AUTOMATED COUNT: 12.6 % (ref 11.5–14.5)
HCT VFR BLD AUTO: 46.1 % (ref 36–46)
HGB BLD-MCNC: 14.5 G/DL (ref 12–16)
IMM GRANULOCYTES # BLD AUTO: 0.01 X10*3/UL (ref 0–0.7)
IMM GRANULOCYTES NFR BLD AUTO: 0.3 % (ref 0–0.9)
LYMPHOCYTES # BLD AUTO: 1.31 X10*3/UL (ref 1.2–4.8)
LYMPHOCYTES NFR BLD AUTO: 33.2 %
MCH RBC QN AUTO: 30.5 PG (ref 26–34)
MCHC RBC AUTO-ENTMCNC: 31.5 G/DL (ref 32–36)
MCV RBC AUTO: 97 FL (ref 80–100)
MONOCYTES # BLD AUTO: 0.37 X10*3/UL (ref 0.1–1)
MONOCYTES NFR BLD AUTO: 9.4 %
NEUTROPHILS # BLD AUTO: 2.13 X10*3/UL (ref 1.2–7.7)
NEUTROPHILS NFR BLD AUTO: 53.8 %
NRBC BLD-RTO: 0 /100 WBCS (ref 0–0)
PLATELET # BLD AUTO: 243 X10*3/UL (ref 150–450)
RBC # BLD AUTO: 4.75 X10*6/UL (ref 4–5.2)
WBC # BLD AUTO: 4 X10*3/UL (ref 4.4–11.3)

## 2025-04-03 PROCEDURE — 85025 COMPLETE CBC W/AUTO DIFF WBC: CPT

## 2025-04-03 NOTE — PROGRESS NOTES
Subjective   Patient ID: Lizzeth Funk is a 64 y.o. female who presents for Follow-up.    HPI     6 month follow up for her previous identified multiple pea sized bilateral Inguinal lymph  nodes.  She reports that now she recalls that her previous pcp noticed the same thing about 4 to 5 years ago.  Not enlarged but multiple. WBC low but stable and familial and lymphocytes normal  .  No systemic symptoms .  She feels well.        Repeat CBC   Component      Latest Ref Rng 8/26/2024 4/3/2025   WBC      4.4 - 11.3 x10*3/uL 3.0 (L)  4.0 (L)    nRBC      0.0 - 0.0 /100 WBCs 0.0  0.0    RBC      4.00 - 5.20 x10*6/uL 4.53  4.75    HEMOGLOBIN      12.0 - 16.0 g/dL 13.7  14.5    HEMATOCRIT      36.0 - 46.0 % 41.3  46.1 (H)    MCV      80 - 100 fL 91  97    MCHC      32.0 - 36.0 g/dL 33.2  31.5 (L)    Platelets      150 - 450 x10*3/uL 201  243    RED CELL DISTRIBUTION WIDTH      11.5 - 14.5 % 12.3  12.6    Neutrophils %      40.0 - 80.0 % 41.7  53.8    Immature Granulocytes %, Automated      0.0 - 0.9 % 0.3  0.3    Lymphocytes %      13.0 - 44.0 % 43.2  33.2    Monocytes %      2.0 - 10.0 % 10.2  9.4    Eosinophils %      0.0 - 6.0 % 3.3  2.3    Basophils %      0.0 - 2.0 % 1.3  1.0    Neutrophils Absolute      1.20 - 7.70 x10*3/uL 1.26  2.13    Lymphocytes Absolute      1.20 - 4.80 x10*3/uL 1.31  1.31    Monocytes Absolute      0.10 - 1.00 x10*3/uL 0.31  0.37    Eosinophils Absolute      0.00 - 0.70 x10*3/uL 0.10  0.09    Basophils Absolute      0.00 - 0.10 x10*3/uL 0.04  0.04    MCH      26.0 - 34.0 pg 30.2  30.5    Immature Granulocytes Absolute, Automated      0.00 - 0.70 x10*3/uL 0.01  0.01       Legend:  (L) Low  (H) High      Problem List   1. 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 leukopenia - has been documented in the past. No other hematologic abnormalities. Her sister has the same. She denies any frequent infections or any associated symptoms or signs.  No night sweats, no excessive fatigue  3. familial  hypercholesteremia - Had CCS 0 in 2020   4. Multiple basal cell carcinomas - follows closely with Dermatology.  Had a Mohs surgery on her forehead in July that has not healed yet.   5. Melanoma - on left calf .    5. Mild MVP: Stress Echo 2015 no change from 2011  6. fibroids gets pelvic ultrasound annually (Dr. Dr. Lashawn Prakash )    had Pelvic MRI this year.   7. osteopenia - 10/5/22  lowest T score -1.5 in L spine  8. Colon Polyp- 8/15/23   8 mm sesile serrated adenoma       Review of Systems    Objective   /68 (BP Location: Left arm, Patient Position: Sitting)   Pulse 62   Temp 36.7 °C (98 °F)   SpO2 95%     Physical Exam  Constitutional:       Appearance: Normal appearance. She is not ill-appearing.   Cardiovascular:      Rate and Rhythm: Normal rate and regular rhythm.   Pulmonary:      Effort: Pulmonary effort is normal.      Breath sounds: Normal breath sounds.   Musculoskeletal:      Cervical back: Normal range of motion.   Lymphadenopathy:      Head:      Right side of head: No submandibular adenopathy.      Left side of head: No submandibular adenopathy.      Cervical: No cervical adenopathy.      Upper Body:      Right upper body: No supraclavicular adenopathy.      Left upper body: No supraclavicular adenopathy.      Lower Body: Right inguinal adenopathy (continued pea sized chain and mobile) present. Left inguinal adenopathy (continued pea sized chain and mobile) present.     Assessment/Plan   Stable pea-sized inguinal lymph node chains bilateral.  No abnormal adenopathy felt in any other location on her body.  Patient is healthy and well without any systemic symptoms.  Minor abnormalities on her CBC has been stable for 4 to 5 years and appears to be familial.  No further workup indicated at this time we will continue to do annual physical exams with lab work.

## 2025-04-04 ENCOUNTER — APPOINTMENT (OUTPATIENT)
Dept: PRIMARY CARE | Facility: CLINIC | Age: 65
End: 2025-04-04
Payer: COMMERCIAL

## 2025-04-04 VITALS
DIASTOLIC BLOOD PRESSURE: 68 MMHG | TEMPERATURE: 98 F | HEART RATE: 62 BPM | SYSTOLIC BLOOD PRESSURE: 101 MMHG | OXYGEN SATURATION: 95 %

## 2025-04-04 DIAGNOSIS — R59.0 LYMPHADENOPATHY, INGUINAL: Primary | ICD-10-CM

## 2025-04-04 PROCEDURE — 99213 OFFICE O/P EST LOW 20 MIN: CPT | Performed by: INTERNAL MEDICINE

## 2025-08-07 DIAGNOSIS — Z00.00 HEALTH MAINTENANCE EXAMINATION: ICD-10-CM

## 2025-09-03 ENCOUNTER — LAB (OUTPATIENT)
Dept: LAB | Facility: HOSPITAL | Age: 65
End: 2025-09-03
Payer: COMMERCIAL

## 2025-09-03 DIAGNOSIS — Z00.00 ENCOUNTER FOR GENERAL ADULT MEDICAL EXAMINATION WITHOUT ABNORMAL FINDINGS: Primary | ICD-10-CM

## 2025-09-03 LAB
25(OH)D3 SERPL-MCNC: 69 NG/ML (ref 30–100)
ALBUMIN SERPL BCP-MCNC: 4.6 G/DL (ref 3.4–5)
ALP SERPL-CCNC: 60 U/L (ref 33–136)
ALT SERPL W P-5'-P-CCNC: 17 U/L (ref 7–45)
ANION GAP SERPL CALC-SCNC: 11 MMOL/L (ref 10–20)
APPEARANCE UR: CLEAR
AST SERPL W P-5'-P-CCNC: 24 U/L (ref 9–39)
BASOPHILS # BLD AUTO: 0.03 X10*3/UL (ref 0–0.1)
BASOPHILS NFR BLD AUTO: 1.1 %
BILIRUB SERPL-MCNC: 0.6 MG/DL (ref 0–1.2)
BILIRUB UR STRIP.AUTO-MCNC: NEGATIVE MG/DL
BUN SERPL-MCNC: 11 MG/DL (ref 6–23)
CALCIUM SERPL-MCNC: 9.7 MG/DL (ref 8.6–10.3)
CHLORIDE SERPL-SCNC: 103 MMOL/L (ref 98–107)
CHOLEST SERPL-MCNC: 220 MG/DL (ref 0–199)
CHOLESTEROL/HDL RATIO: 3
CO2 SERPL-SCNC: 30 MMOL/L (ref 21–32)
COLOR UR: YELLOW
CREAT SERPL-MCNC: 0.51 MG/DL (ref 0.5–1.05)
CRP SERPL HS-MCNC: 0.4 MG/L
EGFRCR SERPLBLD CKD-EPI 2021: >90 ML/MIN/1.73M*2
EOSINOPHIL # BLD AUTO: 0.06 X10*3/UL (ref 0–0.7)
EOSINOPHIL NFR BLD AUTO: 2.2 %
ERYTHROCYTE [DISTWIDTH] IN BLOOD BY AUTOMATED COUNT: 12.1 % (ref 11.5–14.5)
EST. AVERAGE GLUCOSE BLD GHB EST-MCNC: 105 MG/DL
GLUCOSE SERPL-MCNC: 90 MG/DL (ref 74–99)
GLUCOSE UR STRIP.AUTO-MCNC: NEGATIVE MG/DL
HBA1C MFR BLD: 5.3 % (ref ?–5.7)
HCT VFR BLD AUTO: 42.2 % (ref 36–46)
HCV AB SER QL: NONREACTIVE
HDLC SERPL-MCNC: 74.1 MG/DL
HGB BLD-MCNC: 14.1 G/DL (ref 12–16)
IMM GRANULOCYTES # BLD AUTO: 0 X10*3/UL (ref 0–0.7)
IMM GRANULOCYTES NFR BLD AUTO: 0 % (ref 0–0.9)
KETONES UR STRIP.AUTO-MCNC: NEGATIVE MG/DL
LDLC SERPL CALC-MCNC: 136 MG/DL
LEUKOCYTE ESTERASE UR QL STRIP.AUTO: NEGATIVE
LYMPHOCYTES # BLD AUTO: 1.02 X10*3/UL (ref 1.2–4.8)
LYMPHOCYTES NFR BLD AUTO: 37.4 %
MCH RBC QN AUTO: 30.7 PG (ref 26–34)
MCHC RBC AUTO-ENTMCNC: 33.4 G/DL (ref 32–36)
MCV RBC AUTO: 92 FL (ref 80–100)
MONOCYTES # BLD AUTO: 0.25 X10*3/UL (ref 0.1–1)
MONOCYTES NFR BLD AUTO: 9.2 %
NEUTROPHILS # BLD AUTO: 1.37 X10*3/UL (ref 1.2–7.7)
NEUTROPHILS NFR BLD AUTO: 50.1 %
NITRITE UR QL STRIP.AUTO: NEGATIVE
NON HDL CHOLESTEROL: 146 MG/DL (ref 0–149)
PH UR STRIP.AUTO: 7.5 [PH]
PLATELET # BLD AUTO: 200 X10*3/UL (ref 150–450)
POTASSIUM SERPL-SCNC: 4.9 MMOL/L (ref 3.5–5.3)
PROT SERPL-MCNC: 6.7 G/DL (ref 6.4–8.2)
PROT UR STRIP.AUTO-MCNC: NEGATIVE MG/DL
RBC # BLD AUTO: 4.6 X10*6/UL (ref 4–5.2)
RBC # UR STRIP.AUTO: NEGATIVE MG/DL
SODIUM SERPL-SCNC: 139 MMOL/L (ref 136–145)
SP GR UR STRIP.AUTO: 1.01
TRIGL SERPL-MCNC: 50 MG/DL (ref 0–149)
TSH SERPL-ACNC: 1.3 MIU/L (ref 0.44–3.98)
UROBILINOGEN UR STRIP.AUTO-MCNC: 0.2 MG/DL
VIT B12 SERPL-MCNC: 816 PG/ML (ref 211–911)
VLDL: 10 MG/DL (ref 0–40)
WBC # BLD AUTO: 2.7 X10*3/UL (ref 4.4–11.3)

## 2025-09-03 PROCEDURE — 82306 VITAMIN D 25 HYDROXY: CPT

## 2025-09-03 PROCEDURE — 36415 COLL VENOUS BLD VENIPUNCTURE: CPT

## 2025-09-03 PROCEDURE — 80053 COMPREHEN METABOLIC PANEL: CPT

## 2025-09-03 PROCEDURE — 85025 COMPLETE CBC W/AUTO DIFF WBC: CPT

## 2025-09-03 PROCEDURE — 86141 C-REACTIVE PROTEIN HS: CPT

## 2025-09-03 PROCEDURE — 81003 URINALYSIS AUTO W/O SCOPE: CPT

## 2025-09-03 PROCEDURE — 84443 ASSAY THYROID STIM HORMONE: CPT

## 2025-09-03 PROCEDURE — 86803 HEPATITIS C AB TEST: CPT

## 2025-09-03 PROCEDURE — 83036 HEMOGLOBIN GLYCOSYLATED A1C: CPT

## 2025-09-03 PROCEDURE — 80061 LIPID PANEL: CPT

## 2025-09-03 PROCEDURE — 82607 VITAMIN B-12: CPT

## 2025-09-04 LAB — HOLD SPECIMEN: NORMAL

## 2025-09-08 ENCOUNTER — APPOINTMENT (OUTPATIENT)
Dept: PRIMARY CARE | Facility: CLINIC | Age: 65
End: 2025-09-08
Payer: COMMERCIAL